# Patient Record
Sex: FEMALE | Race: WHITE | Employment: OTHER | URBAN - METROPOLITAN AREA
[De-identification: names, ages, dates, MRNs, and addresses within clinical notes are randomized per-mention and may not be internally consistent; named-entity substitution may affect disease eponyms.]

---

## 2017-04-03 ENCOUNTER — ANESTHESIA (OUTPATIENT)
Dept: SURGERY | Age: 82
DRG: 481 | End: 2017-04-03
Payer: MEDICARE

## 2017-04-03 ENCOUNTER — HOSPITAL ENCOUNTER (INPATIENT)
Age: 82
LOS: 3 days | Discharge: NURSING, REHAB OR LONG-TERM CARE | DRG: 481 | End: 2017-04-06
Attending: EMERGENCY MEDICINE | Admitting: ORTHOPAEDIC SURGERY
Payer: MEDICARE

## 2017-04-03 ENCOUNTER — APPOINTMENT (OUTPATIENT)
Dept: GENERAL RADIOLOGY | Age: 82
DRG: 481 | End: 2017-04-03
Attending: ORTHOPAEDIC SURGERY
Payer: MEDICARE

## 2017-04-03 ENCOUNTER — APPOINTMENT (OUTPATIENT)
Dept: GENERAL RADIOLOGY | Age: 82
DRG: 481 | End: 2017-04-03
Attending: EMERGENCY MEDICINE
Payer: MEDICARE

## 2017-04-03 ENCOUNTER — ANESTHESIA EVENT (OUTPATIENT)
Dept: SURGERY | Age: 82
DRG: 481 | End: 2017-04-03
Payer: MEDICARE

## 2017-04-03 DIAGNOSIS — S52.602A RADIUS AND ULNA DISTAL FRACTURE, LEFT, CLOSED, INITIAL ENCOUNTER: ICD-10-CM

## 2017-04-03 DIAGNOSIS — S52.502A RADIUS AND ULNA DISTAL FRACTURE, LEFT, CLOSED, INITIAL ENCOUNTER: ICD-10-CM

## 2017-04-03 DIAGNOSIS — S72.351A CLOSED DISPLACED COMMINUTED FRACTURE OF SHAFT OF RIGHT FEMUR, INITIAL ENCOUNTER (HCC): Primary | ICD-10-CM

## 2017-04-03 PROBLEM — S52.502B OPEN FRACTURE OF LEFT DISTAL RADIUS: Status: ACTIVE | Noted: 2017-04-03

## 2017-04-03 PROBLEM — S72.91XA FEMUR FRACTURE, RIGHT (HCC): Status: ACTIVE | Noted: 2017-04-03

## 2017-04-03 PROBLEM — S52.502B OPEN FRACTURE OF DISTAL END OF LEFT RADIUS: Status: ACTIVE | Noted: 2017-04-03

## 2017-04-03 LAB
ABO + RH BLD: NORMAL
ALBUMIN SERPL BCP-MCNC: 3.8 G/DL (ref 3.5–5)
ALBUMIN/GLOB SERPL: 0.8 {RATIO} (ref 1.1–2.2)
ALP SERPL-CCNC: 127 U/L (ref 45–117)
ALT SERPL-CCNC: 18 U/L (ref 12–78)
ANION GAP BLD CALC-SCNC: 8 MMOL/L (ref 5–15)
AST SERPL W P-5'-P-CCNC: 23 U/L (ref 15–37)
BASOPHILS # BLD AUTO: 0.1 K/UL (ref 0–0.1)
BASOPHILS # BLD: 0 % (ref 0–1)
BILIRUB SERPL-MCNC: 0.6 MG/DL (ref 0.2–1)
BLOOD GROUP ANTIBODIES SERPL: NORMAL
BUN SERPL-MCNC: 19 MG/DL (ref 6–20)
BUN/CREAT SERPL: 19 (ref 12–20)
CALCIUM SERPL-MCNC: 8.7 MG/DL (ref 8.5–10.1)
CHLORIDE SERPL-SCNC: 103 MMOL/L (ref 97–108)
CO2 SERPL-SCNC: 30 MMOL/L (ref 21–32)
CREAT SERPL-MCNC: 0.99 MG/DL (ref 0.55–1.02)
EOSINOPHIL # BLD: 0.3 K/UL (ref 0–0.4)
EOSINOPHIL NFR BLD: 2 % (ref 0–7)
ERYTHROCYTE [DISTWIDTH] IN BLOOD BY AUTOMATED COUNT: 16.4 % (ref 11.5–14.5)
EST. AVERAGE GLUCOSE BLD GHB EST-MCNC: 137 MG/DL
GLOBULIN SER CALC-MCNC: 4.6 G/DL (ref 2–4)
GLUCOSE SERPL-MCNC: 155 MG/DL (ref 65–100)
HBA1C MFR BLD: 6.4 % (ref 4.2–6.3)
HCT VFR BLD AUTO: 41.5 % (ref 35–47)
HGB BLD-MCNC: 12.9 G/DL (ref 11.5–16)
LYMPHOCYTES # BLD AUTO: 15 % (ref 12–49)
LYMPHOCYTES # BLD: 1.9 K/UL (ref 0.8–3.5)
MCH RBC QN AUTO: 24.6 PG (ref 26–34)
MCHC RBC AUTO-ENTMCNC: 31.1 G/DL (ref 30–36.5)
MCV RBC AUTO: 79.2 FL (ref 80–99)
MONOCYTES # BLD: 1.1 K/UL (ref 0–1)
MONOCYTES NFR BLD AUTO: 9 % (ref 5–13)
NEUTS SEG # BLD: 9.1 K/UL (ref 1.8–8)
NEUTS SEG NFR BLD AUTO: 74 % (ref 32–75)
PLATELET # BLD AUTO: 283 K/UL (ref 150–400)
POTASSIUM SERPL-SCNC: 3.2 MMOL/L (ref 3.5–5.1)
PROT SERPL-MCNC: 8.4 G/DL (ref 6.4–8.2)
RBC # BLD AUTO: 5.24 M/UL (ref 3.8–5.2)
SODIUM SERPL-SCNC: 141 MMOL/L (ref 136–145)
SPECIMEN EXP DATE BLD: NORMAL
WBC # BLD AUTO: 12.4 K/UL (ref 3.6–11)

## 2017-04-03 PROCEDURE — 71010 XR CHEST SNGL V: CPT

## 2017-04-03 PROCEDURE — 93005 ELECTROCARDIOGRAM TRACING: CPT

## 2017-04-03 PROCEDURE — 77030013079 HC BLNKT BAIR HGGR 3M -A: Performed by: ANESTHESIOLOGY

## 2017-04-03 PROCEDURE — 77030026438 HC STYL ET INTUB CARD -A: Performed by: NURSE ANESTHETIST, CERTIFIED REGISTERED

## 2017-04-03 PROCEDURE — 74011000250 HC RX REV CODE- 250

## 2017-04-03 PROCEDURE — 76060000038 HC ANESTHESIA 3.5 TO 4 HR: Performed by: ORTHOPAEDIC SURGERY

## 2017-04-03 PROCEDURE — C1713 ANCHOR/SCREW BN/BN,TIS/BN: HCPCS | Performed by: ORTHOPAEDIC SURGERY

## 2017-04-03 PROCEDURE — 96375 TX/PRO/DX INJ NEW DRUG ADDON: CPT

## 2017-04-03 PROCEDURE — 74011250636 HC RX REV CODE- 250/636

## 2017-04-03 PROCEDURE — 77030008467 HC STPLR SKN COVD -B: Performed by: ORTHOPAEDIC SURGERY

## 2017-04-03 PROCEDURE — 73552 X-RAY EXAM OF FEMUR 2/>: CPT

## 2017-04-03 PROCEDURE — 77030014405 HC GD ROD RMR SYNT -C: Performed by: ORTHOPAEDIC SURGERY

## 2017-04-03 PROCEDURE — 76001 XR FLUOROSCOPY OVER 60 MINUTES: CPT

## 2017-04-03 PROCEDURE — 85025 COMPLETE CBC W/AUTO DIFF WBC: CPT | Performed by: EMERGENCY MEDICINE

## 2017-04-03 PROCEDURE — 77030008684 HC TU ET CUF COVD -B: Performed by: NURSE ANESTHETIST, CERTIFIED REGISTERED

## 2017-04-03 PROCEDURE — 0QS806Z REPOSITION RIGHT FEMORAL SHAFT WITH INTRAMEDULLARY INTERNAL FIXATION DEVICE, OPEN APPROACH: ICD-10-PCS | Performed by: ORTHOPAEDIC SURGERY

## 2017-04-03 PROCEDURE — 74011250636 HC RX REV CODE- 250/636: Performed by: ANESTHESIOLOGY

## 2017-04-03 PROCEDURE — 76000 FLUOROSCOPY <1 HR PHYS/QHP: CPT

## 2017-04-03 PROCEDURE — 77030000031 HC BIT DRL QC SYNT -C: Performed by: ORTHOPAEDIC SURGERY

## 2017-04-03 PROCEDURE — 76210000006 HC OR PH I REC 0.5 TO 1 HR: Performed by: ORTHOPAEDIC SURGERY

## 2017-04-03 PROCEDURE — 76010000134 HC OR TIME 3.5 TO 4 HR: Performed by: ORTHOPAEDIC SURGERY

## 2017-04-03 PROCEDURE — 0PSJXZZ REPOSITION LEFT RADIUS, EXTERNAL APPROACH: ICD-10-PCS | Performed by: ORTHOPAEDIC SURGERY

## 2017-04-03 PROCEDURE — 77030021678 HC GLIDESCP STAT DISP VERT -B: Performed by: NURSE ANESTHETIST, CERTIFIED REGISTERED

## 2017-04-03 PROCEDURE — 0PSLXZZ REPOSITION LEFT ULNA, EXTERNAL APPROACH: ICD-10-PCS | Performed by: ORTHOPAEDIC SURGERY

## 2017-04-03 PROCEDURE — 77030002933 HC SUT MCRYL J&J -A: Performed by: ORTHOPAEDIC SURGERY

## 2017-04-03 PROCEDURE — C1769 GUIDE WIRE: HCPCS | Performed by: ORTHOPAEDIC SURGERY

## 2017-04-03 PROCEDURE — 74011250636 HC RX REV CODE- 250/636: Performed by: EMERGENCY MEDICINE

## 2017-04-03 PROCEDURE — 77030018846 HC SOL IRR STRL H20 ICUM -A: Performed by: ORTHOPAEDIC SURGERY

## 2017-04-03 PROCEDURE — 86900 BLOOD TYPING SEROLOGIC ABO: CPT | Performed by: NURSE PRACTITIONER

## 2017-04-03 PROCEDURE — 77030019908 HC STETH ESOPH SIMS -A: Performed by: NURSE ANESTHETIST, CERTIFIED REGISTERED

## 2017-04-03 PROCEDURE — 99285 EMERGENCY DEPT VISIT HI MDM: CPT

## 2017-04-03 PROCEDURE — 0HDDXZZ EXTRACTION OF RIGHT LOWER ARM SKIN, EXTERNAL APPROACH: ICD-10-PCS | Performed by: ORTHOPAEDIC SURGERY

## 2017-04-03 PROCEDURE — 74011250636 HC RX REV CODE- 250/636: Performed by: INTERNAL MEDICINE

## 2017-04-03 PROCEDURE — 77030016474 HC BIT DRL QC3 SYNT -C: Performed by: ORTHOPAEDIC SURGERY

## 2017-04-03 PROCEDURE — 83036 HEMOGLOBIN GLYCOSYLATED A1C: CPT | Performed by: INTERNAL MEDICINE

## 2017-04-03 PROCEDURE — 73070 X-RAY EXAM OF ELBOW: CPT

## 2017-04-03 PROCEDURE — 77030031139 HC SUT VCRL2 J&J -A: Performed by: ORTHOPAEDIC SURGERY

## 2017-04-03 PROCEDURE — 73110 X-RAY EXAM OF WRIST: CPT

## 2017-04-03 PROCEDURE — 36415 COLL VENOUS BLD VENIPUNCTURE: CPT | Performed by: EMERGENCY MEDICINE

## 2017-04-03 PROCEDURE — 80053 COMPREHEN METABOLIC PANEL: CPT | Performed by: EMERGENCY MEDICINE

## 2017-04-03 PROCEDURE — 77030018836 HC SOL IRR NACL ICUM -A: Performed by: ORTHOPAEDIC SURGERY

## 2017-04-03 PROCEDURE — 96374 THER/PROPH/DIAG INJ IV PUSH: CPT

## 2017-04-03 PROCEDURE — 77030011640 HC PAD GRND REM COVD -A: Performed by: ORTHOPAEDIC SURGERY

## 2017-04-03 DEVICE — SCREW BNE L80MM DIA5MM NONSTERILE TIB LT GRN TI ST CANN LOK: Type: IMPLANTABLE DEVICE | Site: LEG | Status: FUNCTIONAL

## 2017-04-03 DEVICE — SCREW BNE L36MM DIA5MM NONSTERILE TIB LT GRN TI ST CANN LOK: Type: IMPLANTABLE DEVICE | Site: LEG | Status: FUNCTIONAL

## 2017-04-03 DEVICE — SCREW BNE L40MM DIA5MM TIB LT GRN TI ST CANN LOK FULL THRD: Type: IMPLANTABLE DEVICE | Site: LEG | Status: FUNCTIONAL

## 2017-04-03 RX ORDER — MORPHINE SULFATE 10 MG/ML
2 INJECTION, SOLUTION INTRAMUSCULAR; INTRAVENOUS
Status: DISCONTINUED | OUTPATIENT
Start: 2017-04-03 | End: 2017-04-04 | Stop reason: HOSPADM

## 2017-04-03 RX ORDER — SODIUM CHLORIDE 0.9 % (FLUSH) 0.9 %
5-10 SYRINGE (ML) INJECTION AS NEEDED
Status: DISCONTINUED | OUTPATIENT
Start: 2017-04-03 | End: 2017-04-04 | Stop reason: HOSPADM

## 2017-04-03 RX ORDER — FENTANYL CITRATE 50 UG/ML
INJECTION, SOLUTION INTRAMUSCULAR; INTRAVENOUS AS NEEDED
Status: DISCONTINUED | OUTPATIENT
Start: 2017-04-03 | End: 2017-04-04 | Stop reason: HOSPADM

## 2017-04-03 RX ORDER — PROPOFOL 10 MG/ML
1 INJECTION, EMULSION INTRAVENOUS
Status: DISCONTINUED | OUTPATIENT
Start: 2017-04-03 | End: 2017-04-03

## 2017-04-03 RX ORDER — METOPROLOL SUCCINATE 25 MG/1
25 TABLET, EXTENDED RELEASE ORAL
COMMUNITY

## 2017-04-03 RX ORDER — PROMETHAZINE HYDROCHLORIDE 12.5 MG/1
12.5 TABLET ORAL
COMMUNITY

## 2017-04-03 RX ORDER — MORPHINE SULFATE 4 MG/ML
4 INJECTION, SOLUTION INTRAMUSCULAR; INTRAVENOUS ONCE
Status: COMPLETED | OUTPATIENT
Start: 2017-04-03 | End: 2017-04-03

## 2017-04-03 RX ORDER — MAGNESIUM SULFATE 100 %
4 CRYSTALS MISCELLANEOUS AS NEEDED
Status: DISCONTINUED | OUTPATIENT
Start: 2017-04-03 | End: 2017-04-04

## 2017-04-03 RX ORDER — ONDANSETRON 2 MG/ML
4 INJECTION INTRAMUSCULAR; INTRAVENOUS
Status: COMPLETED | OUTPATIENT
Start: 2017-04-03 | End: 2017-04-03

## 2017-04-03 RX ORDER — FACIAL-BODY WIPES
10 EACH TOPICAL
COMMUNITY

## 2017-04-03 RX ORDER — SODIUM CHLORIDE 9 MG/ML
75 INJECTION, SOLUTION INTRAVENOUS CONTINUOUS
Status: DISCONTINUED | OUTPATIENT
Start: 2017-04-03 | End: 2017-04-05

## 2017-04-03 RX ORDER — SODIUM CHLORIDE 0.9 % (FLUSH) 0.9 %
5-10 SYRINGE (ML) INJECTION AS NEEDED
Status: DISCONTINUED | OUTPATIENT
Start: 2017-04-03 | End: 2017-04-04

## 2017-04-03 RX ORDER — ONDANSETRON 2 MG/ML
INJECTION INTRAMUSCULAR; INTRAVENOUS AS NEEDED
Status: DISCONTINUED | OUTPATIENT
Start: 2017-04-03 | End: 2017-04-04 | Stop reason: HOSPADM

## 2017-04-03 RX ORDER — ACETAMINOPHEN 500 MG/1
1-2 CAPSULE, LIQUID FILLED ORAL
COMMUNITY
End: 2017-04-06

## 2017-04-03 RX ORDER — FENTANYL CITRATE 50 UG/ML
25 INJECTION, SOLUTION INTRAMUSCULAR; INTRAVENOUS
Status: DISCONTINUED | OUTPATIENT
Start: 2017-04-03 | End: 2017-04-04 | Stop reason: HOSPADM

## 2017-04-03 RX ORDER — LIDOCAINE HYDROCHLORIDE 20 MG/ML
INJECTION, SOLUTION EPIDURAL; INFILTRATION; INTRACAUDAL; PERINEURAL AS NEEDED
Status: DISCONTINUED | OUTPATIENT
Start: 2017-04-03 | End: 2017-04-04 | Stop reason: HOSPADM

## 2017-04-03 RX ORDER — BRIMONIDINE TARTRATE, TIMOLOL MALEATE 2; 5 MG/ML; MG/ML
1 SOLUTION/ DROPS OPHTHALMIC EVERY 12 HOURS
COMMUNITY

## 2017-04-03 RX ORDER — ACETAMINOPHEN 325 MG/1
650 TABLET ORAL EVERY 6 HOURS
Status: DISCONTINUED | OUTPATIENT
Start: 2017-04-03 | End: 2017-04-04

## 2017-04-03 RX ORDER — DEXTROSE 50 % IN WATER (D50W) INTRAVENOUS SYRINGE
12.5-25 AS NEEDED
Status: DISCONTINUED | OUTPATIENT
Start: 2017-04-03 | End: 2017-04-04

## 2017-04-03 RX ORDER — HYDRALAZINE HYDROCHLORIDE 20 MG/ML
10 INJECTION INTRAMUSCULAR; INTRAVENOUS
Status: DISCONTINUED | OUTPATIENT
Start: 2017-04-03 | End: 2017-04-06 | Stop reason: HOSPADM

## 2017-04-03 RX ORDER — PROPOFOL 10 MG/ML
INJECTION, EMULSION INTRAVENOUS AS NEEDED
Status: DISCONTINUED | OUTPATIENT
Start: 2017-04-03 | End: 2017-04-04 | Stop reason: HOSPADM

## 2017-04-03 RX ORDER — CEFAZOLIN SODIUM IN 0.9 % NACL 2 G/100 ML
2 PLASTIC BAG, INJECTION (ML) INTRAVENOUS ONCE
Status: DISCONTINUED | OUTPATIENT
Start: 2017-04-03 | End: 2017-04-04 | Stop reason: HOSPADM

## 2017-04-03 RX ORDER — ADHESIVE BANDAGE
30 BANDAGE TOPICAL DAILY PRN
COMMUNITY

## 2017-04-03 RX ORDER — SODIUM CHLORIDE 0.9 % (FLUSH) 0.9 %
5-10 SYRINGE (ML) INJECTION EVERY 8 HOURS
Status: DISCONTINUED | OUTPATIENT
Start: 2017-04-03 | End: 2017-04-04

## 2017-04-03 RX ORDER — SUCCINYLCHOLINE CHLORIDE 20 MG/ML
INJECTION INTRAMUSCULAR; INTRAVENOUS AS NEEDED
Status: DISCONTINUED | OUTPATIENT
Start: 2017-04-03 | End: 2017-04-04 | Stop reason: HOSPADM

## 2017-04-03 RX ORDER — HYDRALAZINE HYDROCHLORIDE 20 MG/ML
10 INJECTION INTRAMUSCULAR; INTRAVENOUS ONCE
Status: COMPLETED | OUTPATIENT
Start: 2017-04-03 | End: 2017-04-03

## 2017-04-03 RX ORDER — AMOXICILLIN 250 MG
1 CAPSULE ORAL DAILY
Status: ON HOLD | COMMUNITY
End: 2017-04-06

## 2017-04-03 RX ORDER — SODIUM CHLORIDE, SODIUM LACTATE, POTASSIUM CHLORIDE, CALCIUM CHLORIDE 600; 310; 30; 20 MG/100ML; MG/100ML; MG/100ML; MG/100ML
25 INJECTION, SOLUTION INTRAVENOUS CONTINUOUS
Status: DISCONTINUED | OUTPATIENT
Start: 2017-04-03 | End: 2017-04-04 | Stop reason: HOSPADM

## 2017-04-03 RX ORDER — GLYCOPYRROLATE 0.2 MG/ML
INJECTION INTRAMUSCULAR; INTRAVENOUS AS NEEDED
Status: DISCONTINUED | OUTPATIENT
Start: 2017-04-03 | End: 2017-04-04 | Stop reason: HOSPADM

## 2017-04-03 RX ORDER — OXYCODONE HYDROCHLORIDE 5 MG/1
10 TABLET ORAL
Status: DISCONTINUED | OUTPATIENT
Start: 2017-04-03 | End: 2017-04-04

## 2017-04-03 RX ORDER — ONDANSETRON 2 MG/ML
4 INJECTION INTRAMUSCULAR; INTRAVENOUS
Status: DISCONTINUED | OUTPATIENT
Start: 2017-04-03 | End: 2017-04-05

## 2017-04-03 RX ORDER — LEVOTHYROXINE SODIUM 100 UG/1
100 TABLET ORAL
Status: DISCONTINUED | OUTPATIENT
Start: 2017-04-04 | End: 2017-04-06

## 2017-04-03 RX ORDER — HYDROMORPHONE HYDROCHLORIDE 1 MG/ML
.2-.5 INJECTION, SOLUTION INTRAMUSCULAR; INTRAVENOUS; SUBCUTANEOUS
Status: DISCONTINUED | OUTPATIENT
Start: 2017-04-03 | End: 2017-04-04 | Stop reason: HOSPADM

## 2017-04-03 RX ORDER — DIPHENHYDRAMINE HYDROCHLORIDE 50 MG/ML
12.5 INJECTION, SOLUTION INTRAMUSCULAR; INTRAVENOUS
Status: DISCONTINUED | OUTPATIENT
Start: 2017-04-03 | End: 2017-04-04 | Stop reason: HOSPADM

## 2017-04-03 RX ORDER — LIDOCAINE HYDROCHLORIDE 10 MG/ML
0.1 INJECTION, SOLUTION EPIDURAL; INFILTRATION; INTRACAUDAL; PERINEURAL AS NEEDED
Status: DISCONTINUED | OUTPATIENT
Start: 2017-04-03 | End: 2017-04-04 | Stop reason: HOSPADM

## 2017-04-03 RX ORDER — SODIUM CHLORIDE 0.9 % (FLUSH) 0.9 %
5-10 SYRINGE (ML) INJECTION EVERY 8 HOURS
Status: DISCONTINUED | OUTPATIENT
Start: 2017-04-03 | End: 2017-04-04 | Stop reason: HOSPADM

## 2017-04-03 RX ORDER — TIMOLOL MALEATE 5 MG/ML
1 SOLUTION/ DROPS OPHTHALMIC 2 TIMES DAILY
Status: DISCONTINUED | OUTPATIENT
Start: 2017-04-04 | End: 2017-04-04

## 2017-04-03 RX ORDER — PHENYLEPHRINE HCL IN 0.9% NACL 0.4MG/10ML
SYRINGE (ML) INTRAVENOUS AS NEEDED
Status: DISCONTINUED | OUTPATIENT
Start: 2017-04-03 | End: 2017-04-04 | Stop reason: HOSPADM

## 2017-04-03 RX ORDER — METOPROLOL SUCCINATE 25 MG/1
25 TABLET, EXTENDED RELEASE ORAL DAILY
Status: DISCONTINUED | OUTPATIENT
Start: 2017-04-04 | End: 2017-04-06 | Stop reason: HOSPADM

## 2017-04-03 RX ORDER — MORPHINE SULFATE 2 MG/ML
2 INJECTION, SOLUTION INTRAMUSCULAR; INTRAVENOUS
Status: DISCONTINUED | OUTPATIENT
Start: 2017-04-03 | End: 2017-04-04

## 2017-04-03 RX ORDER — OXYCODONE HYDROCHLORIDE 5 MG/1
5 TABLET ORAL
Status: DISCONTINUED | OUTPATIENT
Start: 2017-04-03 | End: 2017-04-04

## 2017-04-03 RX ORDER — AMOXICILLIN 250 MG
2 CAPSULE ORAL 2 TIMES DAILY
Status: DISCONTINUED | OUTPATIENT
Start: 2017-04-03 | End: 2017-04-06 | Stop reason: HOSPADM

## 2017-04-03 RX ORDER — HYDROMORPHONE HYDROCHLORIDE 2 MG/ML
INJECTION, SOLUTION INTRAMUSCULAR; INTRAVENOUS; SUBCUTANEOUS AS NEEDED
Status: DISCONTINUED | OUTPATIENT
Start: 2017-04-03 | End: 2017-04-04 | Stop reason: HOSPADM

## 2017-04-03 RX ORDER — NALOXONE HYDROCHLORIDE 0.4 MG/ML
0.4 INJECTION, SOLUTION INTRAMUSCULAR; INTRAVENOUS; SUBCUTANEOUS AS NEEDED
Status: DISCONTINUED | OUTPATIENT
Start: 2017-04-03 | End: 2017-04-05

## 2017-04-03 RX ORDER — INSULIN LISPRO 100 [IU]/ML
INJECTION, SOLUTION INTRAVENOUS; SUBCUTANEOUS
Status: DISCONTINUED | OUTPATIENT
Start: 2017-04-03 | End: 2017-04-04

## 2017-04-03 RX ORDER — ERGOCALCIFEROL 1.25 MG/1
50000 CAPSULE ORAL
COMMUNITY

## 2017-04-03 RX ORDER — LEVOTHYROXINE SODIUM 100 UG/1
100 TABLET ORAL
COMMUNITY
End: 2018-01-01 | Stop reason: DRUGHIGH

## 2017-04-03 RX ADMIN — HYDRALAZINE HYDROCHLORIDE 10 MG: 20 INJECTION INTRAMUSCULAR; INTRAVENOUS at 20:09

## 2017-04-03 RX ADMIN — LIDOCAINE HYDROCHLORIDE 80 MG: 20 INJECTION, SOLUTION EPIDURAL; INFILTRATION; INTRACAUDAL; PERINEURAL at 21:30

## 2017-04-03 RX ADMIN — ONDANSETRON HYDROCHLORIDE 4 MG: 2 INJECTION, SOLUTION INTRAMUSCULAR; INTRAVENOUS at 15:54

## 2017-04-03 RX ADMIN — GLYCOPYRROLATE 0.4 MG: 0.2 INJECTION INTRAMUSCULAR; INTRAVENOUS at 22:01

## 2017-04-03 RX ADMIN — HYDROMORPHONE HYDROCHLORIDE 0.5 MG: 2 INJECTION, SOLUTION INTRAMUSCULAR; INTRAVENOUS; SUBCUTANEOUS at 22:25

## 2017-04-03 RX ADMIN — ONDANSETRON 4 MG: 2 INJECTION INTRAMUSCULAR; INTRAVENOUS at 23:19

## 2017-04-03 RX ADMIN — Medication 120 MCG: at 22:01

## 2017-04-03 RX ADMIN — PROPOFOL 70.8 MG: 10 INJECTION, EMULSION INTRAVENOUS at 18:53

## 2017-04-03 RX ADMIN — SUCCINYLCHOLINE CHLORIDE 80 MG: 20 INJECTION INTRAMUSCULAR; INTRAVENOUS at 21:30

## 2017-04-03 RX ADMIN — GLYCOPYRROLATE 0.2 MG: 0.2 INJECTION INTRAMUSCULAR; INTRAVENOUS at 21:48

## 2017-04-03 RX ADMIN — Medication 120 MCG: at 21:38

## 2017-04-03 RX ADMIN — PROPOFOL 100 MG: 10 INJECTION, EMULSION INTRAVENOUS at 21:30

## 2017-04-03 RX ADMIN — PROPOFOL 30 MG: 10 INJECTION, EMULSION INTRAVENOUS at 19:08

## 2017-04-03 RX ADMIN — FENTANYL CITRATE 100 MCG: 50 INJECTION, SOLUTION INTRAMUSCULAR; INTRAVENOUS at 21:30

## 2017-04-03 RX ADMIN — SODIUM CHLORIDE, POTASSIUM CHLORIDE, SODIUM LACTATE AND CALCIUM CHLORIDE: 600; 310; 30; 20 INJECTION, SOLUTION INTRAVENOUS at 20:56

## 2017-04-03 RX ADMIN — MORPHINE SULFATE 4 MG: 4 INJECTION, SOLUTION INTRAMUSCULAR; INTRAVENOUS at 15:55

## 2017-04-03 RX ADMIN — PROPOFOL 30 MG: 10 INJECTION, EMULSION INTRAVENOUS at 19:02

## 2017-04-03 NOTE — IP AVS SNAPSHOT
Current Discharge Medication List  
  
START taking these medications Dose & Instructions Dispensing Information Comments Morning Noon Evening Bedtime  
 acetaminophen 325 mg tablet Commonly known as:  TYLENOL Replaces:  Mapap 500 mg Cap Your last dose was: Your next dose is:    
   
   
 Dose:  650 mg Take 2 Tabs by mouth every six (6) hours for 14 days. Quantity:  112 Tab Refills:  0  
     
   
   
   
  
 amLODIPine 5 mg tablet Commonly known as:  Corinne Lees Your last dose was: Your next dose is:    
   
   
 Dose:  5 mg Take 1 Tab by mouth daily. Quantity:  30 Tab Refills:  0  
     
   
   
   
  
 ascorbic acid (vitamin C) 500 mg tablet Commonly known as:  VITAMIN C Your last dose was: Your next dose is:    
   
   
 Dose:  500 mg Take 1 Tab by mouth two (2) times a day for 30 days. Quantity:  60 Tab Refills:  0  
     
   
   
   
  
 aspirin 325 mg tablet Commonly known as:  ASPIRIN Start taking on:  4/17/2017 Your last dose was: Your next dose is:    
   
   
 Dose:  325 mg Take 1 Tab by mouth daily for 14 days. Starting on 4/17/17 after completing two weeks of Lovenox Quantity:  28 Tab Refills:  0 Calcium Citrate-Vitamin D3 tablet Commonly known as:  Shala Neil Your last dose was: Your next dose is:    
   
   
 Dose:  1 Tab Take 1 Tab by mouth two (2) times a day for 30 days. Quantity:  60 Tab Refills:  0  
     
   
   
   
  
 cholecalciferol 1,000 unit tablet Commonly known as:  VITAMIN D3 Your last dose was: Your next dose is:    
   
   
 Dose:  4000 Units Take 4 Tabs by mouth daily for 30 days. Quantity:  120 Tab Refills:  0  
     
   
   
   
  
 enoxaparin 40 mg/0.4 mL Commonly known as:  LOVENOX Your last dose was:     
   
Your next dose is:    
   
   
 Dose:  40 mg  
 0.4 mL by SubCUTAneous route every twenty-four (24) hours for 11 days. Last dose 4/17/17  Indications: DEEP VEIN THROMBOSIS PREVENTION Quantity:  4.4 mL Refills:  0  
     
   
   
   
  
 oxyCODONE IR 5 mg immediate release tablet Commonly known as:  Dayton Jean Your last dose was: Your next dose is:    
   
   
 Dose:  5-10 mg Take 1-2 Tabs by mouth every four (4) hours as needed. Max Daily Amount: 60 mg.  
 Quantity:  30 Tab Refills:  0  
     
   
   
   
  
 polyethylene glycol 17 gram/dose powder Commonly known as:  [place]eta [place] Your last dose was: Your next dose is:    
   
   
 Dose:  17 g Take 17 g by mouth daily for 15 days. Quantity:  255 g Refills:  0  
     
   
   
   
  
 traMADol 50 mg tablet Commonly known as:  ULTRAM  
   
Your last dose was: Your next dose is:    
   
   
 Dose:  50 mg Take 1 Tab by mouth every six (6) hours as needed. Max Daily Amount: 200 mg. Quantity:  30 Tab Refills:  0 CONTINUE these medications which have CHANGED Dose & Instructions Dispensing Information Comments Morning Noon Evening Bedtime SENEXON-S 8.6-50 mg per tablet Generic drug:  senna-docusate What changed:  when to take this Your last dose was: Your next dose is:    
   
   
 Dose:  1 Tab Take 1 Tab by mouth two (2) times a day. Refills:  0 CONTINUE these medications which have NOT CHANGED Dose & Instructions Dispensing Information Comments Morning Noon Evening Bedtime BISAC-EVAC 10 mg suppository Generic drug:  bisacodyl Your last dose was: Your next dose is:    
   
   
 Dose:  10 mg Insert 10 mg into rectum daily. Refills:  0  
     
   
   
   
  
 COMBIGAN 0.2-0.5 % Drop ophthalmic solution Generic drug:  brimonidine-timolol Your last dose was: Your next dose is:    
   
   
 Dose:  1 Drop Administer 1 Drop to right eye every twelve (12) hours. Refills:  0  
     
   
   
   
  
 DISPOSABLE ENEMA 19-7 gram/118 mL enema Generic drug:  sodium phosphate Your last dose was: Your next dose is:    
   
   
 Dose:  1 Enema Insert 1 Enema into rectum now. Refills:  0  
     
   
   
   
  
 levothyroxine 100 mcg tablet Commonly known as:  SYNTHROID Your last dose was: Your next dose is:    
   
   
 Dose:  100 mcg Take 100 mcg by mouth Daily (before breakfast). Refills:  0  
     
   
   
   
  
 metoprolol succinate 25 mg XL tablet Commonly known as:  TOPROL-XL Your last dose was: Your next dose is:    
   
   
 Dose:  25 mg Take 25 mg by mouth daily. Refills:  0 MILK OF MAGNESIA 400 mg/5 mL suspension Generic drug:  magnesium hydroxide Your last dose was: Your next dose is:    
   
   
 Dose:  30 mL Take 30 mL by mouth daily as needed for Constipation. Refills:  0  
     
   
   
   
  
 promethazine 12.5 mg tablet Commonly known as:  PHENERGAN Your last dose was: Your next dose is: Take  by mouth every six (6) hours as needed for Nausea. Refills:  0  
     
   
   
   
  
 VITAMIN D2 50,000 unit capsule Generic drug:  ergocalciferol Your last dose was: Your next dose is:    
   
   
 Dose:  48052 Units Take 50,000 Units by mouth every Sunday. Refills:  0 STOP taking these medications Mapap 500 mg Cap Generic drug:  acetaminophen Replaced by:  acetaminophen 325 mg tablet Where to Get Your Medications Information on where to get these meds will be given to you by the nurse or doctor. ! Ask your nurse or doctor about these medications  
  acetaminophen 325 mg tablet  
 amLODIPine 5 mg tablet  
 ascorbic acid (vitamin C) 500 mg tablet  
 aspirin 325 mg tablet Calcium Citrate-Vitamin D3 tablet  
 cholecalciferol 1,000 unit tablet  
 enoxaparin 40 mg/0.4 mL  
 oxyCODONE IR 5 mg immediate release tablet  
 polyethylene glycol 17 gram/dose powder  
 traMADol 50 mg tablet

## 2017-04-03 NOTE — H&P
ORTHOPAEDIC CONSULT NOTE    Subjective:     Date of Consultation:  April 3, 2017      Ashvin Argueta is a 80 y.o. female who is being seen for R midshaft femur fracture and L distal radius fracture. Pt states she tripped on the sidewalk while helping her sister get out of the car this afternoon. Pt lives at an assisted living facility. Pt's family at bedside during exam. Plan of care discussed with pt and family, all questions/concerns addressed and pt and family verbalized understanding of plan of care.      Patient Active Problem List    Diagnosis Date Noted    Femur fracture, right ([address] 2017    Open fracture of distal end of left radius 2017    Femoral fracture ([address] 2016    After-cataract, obscuring vision 2014    Primary open-angle glaucoma 2012    Acute renal failure ([address] 10/14/2011    Diarrhea 10/14/2011    Vitamin D deficiency 10/06/2010    Macular puckering of retina, right eye     Unspecified hypothyroidism     Diffuse cystic mastopathy     HTN (hypertension) 2010    Glaucoma 2010    Cholelithiases 2010    Stress incontinence 2010    Kidney stone 2010    Decreased hearing 2010    DM type 2 (diabetes mellitus, type 2) ([address] 2010    Actinic keratoses 2010     Family History   Problem Relation Age of Onset    Heart Disease Mother     Cancer Brother      lung    Cancer Sister      gallbladder  12    Diabetes Sister     Cancer Sister      colon at 48    Cancer Brother      leukemia      Social History   Substance Use Topics    Smoking status: Former Smoker     Packs/day: 1.50     Years: 20.00     Quit date: 1982    Smokeless tobacco: Never Used    Alcohol use No     Past Medical History:   Diagnosis Date    Actinic keratoses 3/19/2010    Cholelithiases 3/19/2010    Decreased hearing 3/19/2010    Diffuse cystic mastopathy     Elevated C-reactive protein (CRP)     Glaucoma 3/19/2010    History of kidney stones     HTN (hypertension) 3/19/2010    Macular puckering of retina, right eye     Stress incontinence 3/19/2010    Unspecified hypothyroidism     Vitamin D deficiency 10/6/2010      Past Surgical History:   Procedure Laterality Date    ENDOSCOPY, COLON, DIAGNOSTIC  5/1/06    HX APPENDECTOMY      HX CATARACT REMOVAL      HX CYST INCISION AND DRAINAGE      left breast x 2    HX DINESH AND BSO      over several procedures    HX TONSILLECTOMY      HX TRABECULECTOMY  1990    laser OD      Prior to Admission medications    Medication Sig Start Date End Date Taking? Authorizing Provider   brimonidine-timolol (COMBIGAN) 0.2-0.5 % drop ophthalmic solution Administer 1 Drop to right eye every twelve (12) hours. Yes Tyler Muller MD   bisacodyl (BISAC-EVAC) 10 mg suppository Insert 10 mg into rectum daily. Yes yTler Muller MD   sodium phosphate (DISPOSABLE ENEMA) 19-7 gram/118 mL enema Insert 1 Enema into rectum now. Yes Tyler Muller MD   acetaminophen (MAPAP) 500 mg cap Take 1-2 Tabs by mouth every six (6) hours as needed. Yes Tyler Muller MD   magnesium hydroxide (MILK OF MAGNESIA) 400 mg/5 mL suspension Take 30 mL by mouth daily as needed for Constipation. Yes Tyler Muller MD   promethazine (PHENERGAN) 12.5 mg tablet Take  by mouth every six (6) hours as needed for Nausea. Yes Tyler Muller MD   senna-docusate (SENEXON-S) 8.6-50 mg per tablet Take 1 Tab by mouth daily. Yes Tyler Muller MD   metoprolol succinate (TOPROL-XL) 25 mg XL tablet Take 25 mg by mouth daily. Yes Tyler Muller MD   ergocalciferol (VITAMIN D2) 50,000 unit capsule Take 50,000 Units by mouth every Sunday. Wilmer Muller MD   levothyroxine (SYNTHROID) 100 mcg tablet Take 100 mcg by mouth Daily (before breakfast).    Yes Tyler Muller MD     Current Facility-Administered Medications   Medication Dose Route Frequency    propofol (DIPRIVAN) 10 mg/mL injection 70.8 mg  1 mg/kg IntraVENous TITRATE     Current Outpatient Prescriptions   Medication Sig    brimonidine-timolol (COMBIGAN) 0.2-0.5 % drop ophthalmic solution Administer 1 Drop to right eye every twelve (12) hours.  bisacodyl (BISAC-EVAC) 10 mg suppository Insert 10 mg into rectum daily.  sodium phosphate (DISPOSABLE ENEMA) 19-7 gram/118 mL enema Insert 1 Enema into rectum now.  acetaminophen (MAPAP) 500 mg cap Take 1-2 Tabs by mouth every six (6) hours as needed.  magnesium hydroxide (MILK OF MAGNESIA) 400 mg/5 mL suspension Take 30 mL by mouth daily as needed for Constipation.  promethazine (PHENERGAN) 12.5 mg tablet Take  by mouth every six (6) hours as needed for Nausea.  senna-docusate (SENEXON-S) 8.6-50 mg per tablet Take 1 Tab by mouth daily.  metoprolol succinate (TOPROL-XL) 25 mg XL tablet Take 25 mg by mouth daily.  ergocalciferol (VITAMIN D2) 50,000 unit capsule Take 50,000 Units by mouth every .  levothyroxine (SYNTHROID) 100 mcg tablet Take 100 mcg by mouth Daily (before breakfast). Allergies   Allergen Reactions    Codeine Itching    Ibuprofen Other (comments)     kidney failure    Pcn [Penicillins] Hives    Sulfur Not Reported This Time        Review of Systems:  A comprehensive review of systems was negative except for that written in the HPI.     Mental Status: no dementia    Objective:     Patient Vitals for the past 8 hrs:   BP Temp Pulse Resp SpO2 Height Weight   17 1914 (!) 219/68 - 60 18 100 % - -   17 1909 (!) 219/84 - (!) 59 22 98 % - -   17 1901 - - 61 18 99 % - -   17 1900 190/62 - 62 16 100 % - -   17 1845 196/60 - 60 14 95 % - -   17 1522 (!) 252/80 98.1 °F (36.7 °C) 60 13 97 % 5' 3\" (1.6 m) 70.8 kg (156 lb)     Temp (24hrs), Av.1 °F (36.7 °C), Min:98.1 °F (36.7 °C), Max:98.1 °F (36.7 °C)      Gen: Well-developed,  in no acute distress   Musc: RLE - + deformity of the midshaft femur, NVI  LUE - + puncture wound to the ventral side of the forearm with ecchymosis with deformity, NVI    Skin: No skin breakdown noted. Skin warm, pink, dry  Neuro: Cranial nerves are grossly intact, no focal motor weakness, follows commands appropriately   Psych: Good insight, oriented to person, place and time, alert    Imaging Review: INDICATION: mid shaft femur fx.     COMPARISON: None.     FINDINGS: Two views of the right femur on 5 images demonstrate a comminuted  fracture of the right femur centered at the junction of the middle and distal  thirds demonstrating 100% medial displacement associated with up to 5 cm  telescoping. There is moderate medial angular deformity. No dislocation is  shown. The bones are severely osteopenic. Mild osteoarthrosis of the right knee  and moderate osteoarthrosis of the right hip is demonstrated.      IMPRESSION  IMPRESSION: Comminuted fracture of right femur at junction of middle and distal  thirds with 100% displacement and mild shortening.     INDICATION: Trauma. Left wrist pain     COMPARISON: [Radiographs 2/23/2015.     FINDINGS: Three views of the left wrist demonstrate severe diffuse osteopenia  with an interval comminuted articular fracture of the distal radius  demonstrating 100% dorsal displacement with up to 5-6 mm overriding. The distal  articular margin shows moderate dorsal annular deformity. Note dislocation is  shown. Fracture at the base of the ulnar styloid process is also demonstrated. Moderate to severe first carpal metacarpal osteoarthrosis and polyarticular  digital osteoarthrosis is again shown.  Chondrocalcinosis at the medial  radiocarpal joint is again demonstrated.     IMPRESSION  IMPRESSION: Intra-articular fracture distal radius with 100% dorsal  displacement and shortening demonstrated.       Labs:   Recent Results (from the past 24 hour(s))   EKG, 12 LEAD, INITIAL    Collection Time: 04/03/17  3:40 PM   Result Value Ref Range    Ventricular Rate 63 BPM    Atrial Rate 63 BPM    P-R Interval 184 ms    QRS Duration 88 ms    Q-T Interval 434 ms    QTC Calculation (Bezet) 444 ms    Calculated P Axis 12 degrees    Calculated R Axis -15 degrees    Calculated T Axis 44 degrees    Diagnosis       Sinus rhythm with marked sinus arrhythmia with occasional premature   ventricular complexes  Left ventricular hypertrophy with repolarization abnormality  When compared with ECG of 28-MAY-2016 15:36,  premature ventricular complexes are now present  premature atrial complexes are no longer present     CBC WITH AUTOMATED DIFF    Collection Time: 04/03/17  4:02 PM   Result Value Ref Range    WBC 12.4 (H) 3.6 - 11.0 K/uL    RBC 5.24 (H) 3.80 - 5.20 M/uL    HGB 12.9 11.5 - 16.0 g/dL    HCT 41.5 35.0 - 47.0 %    MCV 79.2 (L) 80.0 - 99.0 FL    MCH 24.6 (L) 26.0 - 34.0 PG    MCHC 31.1 30.0 - 36.5 g/dL    RDW 16.4 (H) 11.5 - 14.5 %    PLATELET 941 585 - 229 K/uL    NEUTROPHILS 74 32 - 75 %    LYMPHOCYTES 15 12 - 49 %    MONOCYTES 9 5 - 13 %    EOSINOPHILS 2 0 - 7 %    BASOPHILS 0 0 - 1 %    ABS. NEUTROPHILS 9.1 (H) 1.8 - 8.0 K/UL    ABS. LYMPHOCYTES 1.9 0.8 - 3.5 K/UL    ABS. MONOCYTES 1.1 (H) 0.0 - 1.0 K/UL    ABS. EOSINOPHILS 0.3 0.0 - 0.4 K/UL    ABS. BASOPHILS 0.1 0.0 - 0.1 K/UL   METABOLIC PANEL, COMPREHENSIVE    Collection Time: 04/03/17  4:02 PM   Result Value Ref Range    Sodium 141 136 - 145 mmol/L    Potassium 3.2 (L) 3.5 - 5.1 mmol/L    Chloride 103 97 - 108 mmol/L    CO2 30 21 - 32 mmol/L    Anion gap 8 5 - 15 mmol/L    Glucose 155 (H) 65 - 100 mg/dL    BUN 19 6 - 20 MG/DL    Creatinine 0.99 0.55 - 1.02 MG/DL    BUN/Creatinine ratio 19 12 - 20      GFR est AA >60 >60 ml/min/1.73m2    GFR est non-AA 53 (L) >60 ml/min/1.73m2    Calcium 8.7 8.5 - 10.1 MG/DL    Bilirubin, total 0.6 0.2 - 1.0 MG/DL    ALT (SGPT) 18 12 - 78 U/L    AST (SGOT) 23 15 - 37 U/L    Alk.  phosphatase 127 (H) 45 - 117 U/L    Protein, total 8.4 (H) 6.4 - 8.2 g/dL    Albumin 3.8 3.5 - 5.0 g/dL    Globulin 4.6 (H) 2.0 - 4.0 g/dL    A-G Ratio 0.8 (L) 1.1 - 2.2           Impression: Patient Active Problem List    Diagnosis Date Noted    [address]ur fracture, right [address] 04/03/2017    Open fracture of distal end of left radius 04/03/2017    Femoral fracture ([facility] 05/28/2016    After-cataract, obscuring vision 08/07/2014    Primary open-angle glaucoma 08/29/2012    Acute renal failure ([address] 10/14/2011    Diarrhea 10/14/2011    Vitamin D deficiency 10/06/2010    Macular puckering of retina, right eye     Unspecified hypothyroidism     Diffuse cystic mastopathy     HTN (hypertension) 03/19/2010    Glaucoma 03/19/2010    Cholelithiases 03/19/2010    Stress incontinence 03/19/2010    Kidney stone 03/19/2010    Decreased hearing 03/19/2010    DM type 2 (diabetes mellitus, type 2) ([facility] 03/19/2010    Actinic keratoses 03/19/2010     Principal Problem:    Open fracture of distal end of left radius (4/3/2017)    Active Problems:    Femur fracture, right ([address] (4/3/2017)        Plan:   - ORIF of R femur tonight with I&D of open L distal radius fracture with Dr. Antoinette Butlre   - Medicine consulted for clearance and management of DM.   - DM (POA) - pt states she stopped her Actos a year ago and per review of the chart her PCP has been monitoring her BG   - Posterior Long Leg Splint placed to RLE with Closed Reduction with Sugar Tong Splint of Open L distal radius, see procedure note     Dr. Antoinette Butler aware and agrees with plan as above.         Sissy Hillman NP  Orthopedic Nurse Practitioner   [place]

## 2017-04-03 NOTE — ANESTHESIA PREPROCEDURE EVALUATION
Anesthetic History   No history of anesthetic complications            Review of Systems / Medical History  Patient summary reviewed, nursing notes reviewed and pertinent labs reviewed    Pulmonary          Smoker      Comments: Former smoker   Neuro/Psych         Dementia    Comments: Very hard of hearing. Mild dementia.  Cardiovascular    Hypertension: poorly controlled              Exercise tolerance: <4 METS     GI/Hepatic/Renal         Renal disease: stones and ARF       Endo/Other    Diabetes: well controlled, type 2  Hypothyroidism: well controlled       Other Findings   Comments:   Glaucoma            Physical Exam    Airway  Mallampati: III  TM Distance: 4 - 6 cm  Neck ROM: normal range of motion   Mouth opening: Normal     Cardiovascular    Rhythm: irregular  Rate: normal         Dental    Dentition: Implants     Pulmonary  Breath sounds clear to auscultation               Abdominal  GI exam deferred       Other Findings            Anesthetic Plan    ASA: 3, emergent  Anesthesia type: general    Monitoring Plan: BIS      Induction: Intravenous  Anesthetic plan and risks discussed with: Patient and Son / Daughter

## 2017-04-03 NOTE — PROGRESS NOTES
Pharmacy Medication Reconciliation     Recommendations/Findings: The following amendments were made to the patient's active medication list on file at [facility]:   1) Additions: toprol    2) Deletions: lopressor    3) Changes: none    -Clarified PTA med list with paperwork provided by nursing home. PTA medication list was corrected to the following:     Prior to Admission Medications   Prescriptions Last Dose Informant Patient Reported? Taking?   acetaminophen (MAPAP) 500 mg cap   Yes Yes   Sig: Take 1-2 Tabs by mouth every six (6) hours as needed. bisacodyl (BISAC-EVAC) 10 mg suppository   Yes Yes   Sig: Insert 10 mg into rectum daily. brimonidine-timolol (COMBIGAN) 0.2-0.5 % drop ophthalmic solution   Yes Yes   Sig: Administer 1 Drop to right eye every twelve (12) hours. ergocalciferol (VITAMIN D2) 50,000 unit capsule   Yes Yes   Sig: Take 50,000 Units by mouth every Sunday. levothyroxine (SYNTHROID) 100 mcg tablet   Yes Yes   Sig: Take 100 mcg by mouth Daily (before breakfast). magnesium hydroxide (MILK OF MAGNESIA) 400 mg/5 mL suspension   Yes Yes   Sig: Take 30 mL by mouth daily as needed for Constipation. metoprolol succinate (TOPROL-XL) 25 mg XL tablet   Yes Yes   Sig: Take 25 mg by mouth daily. promethazine (PHENERGAN) 12.5 mg tablet   Yes Yes   Sig: Take  by mouth every six (6) hours as needed for Nausea. senna-docusate (SENEXON-S) 8.6-50 mg per tablet   Yes Yes   Sig: Take 1 Tab by mouth daily. sodium phosphate (DISPOSABLE ENEMA) 19-7 gram/118 mL enema   Yes Yes   Sig: Insert 1 Enema into rectum now.       Facility-Administered Medications: None          Thank you,  Coni Torrez, PHARMD

## 2017-04-03 NOTE — IP AVS SNAPSHOT
[address] [facility] 
[phone] Patient: Mary Lemons MRN: [id number] You are allergic to the following Allergen Reactions Codeine Itching Ibuprofen Other (comments)  
 kidney failure Pcn (Penicillins) Hives Sulfur Not Reported This Time Immunizations Administered for This Admission Name Date Tdap 2017 Recent Documentation Height Weight BMI OB Status Smoking Status 1.6 m 70.8 kg 27.63 kg/m2 Hysterectomy Former Smoker Emergency Contacts Name Discharge Info Relation Home Work Mobile Paulina Sample  Child [2]   [phone] About your hospitalization You were admitted on:  April 3, 2017 You last received care in the:  [facility] 3 ORTHOPEDICS You were discharged on:  2017 Unit phone number:  [phone] Why you were hospitalized Your primary diagnosis was:  Open Fracture Of Distal End Of Left Radius Your diagnoses also included:  Femur Fracture, Right (Hcc), Open Fracture Of Left Distal Radius Providers Seen During Your Hospitalizations Provider Role Specialty Primary office phone Ji Guzmán MD Attending Provider Emergency Medicine [phone] Jamie Soler MD Attending Provider Orthopedic Surgery [phone] Your Primary Care Physician (PCP) Primary Care Physician Office Phone Office Fax  
 Antoinetteyoni Morales Dave [phone] Follow-up Information Follow up With Details Comments Contact Info FAVIAN ([facility]) On 2017 This will be the provider of your therapy. If you have any questions, please contact them directly. [address] [place] [address] 
[phone] Jamie Soler MD Schedule an appointment as soon as possible for a visit in 10 days  [facility] [address] [facility] 
[phone] Asha Padilla MD On 5/11/2017 At 1:30 PM [facility] [address] [facility] 
[phone] Your Appointments Thursday May 11, 2017  1:30 PM EDT New Patient with MD Gregorio Olsen [facility]) [facility] [address] [address] [phone] Current Discharge Medication List  
  
START taking these medications Dose & Instructions Dispensing Information Comments Morning Noon Evening Bedtime  
 acetaminophen 325 mg tablet Commonly known as:  TYLENOL Replaces:  Mapap 500 mg Cap Your last dose was: Your next dose is:    
   
   
 Dose:  650 mg Take 2 Tabs by mouth every six (6) hours for 14 days. Quantity:  112 Tab Refills:  0  
     
   
   
   
  
 amLODIPine 5 mg tablet Commonly known as:  Sherald Burkitt Your last dose was: Your next dose is:    
   
   
 Dose:  5 mg Take 1 Tab by mouth daily. Quantity:  30 Tab Refills:  0  
     
   
   
   
  
 ascorbic acid (vitamin C) 500 mg tablet Commonly known as:  VITAMIN C Your last dose was: Your next dose is:    
   
   
 Dose:  500 mg Take 1 Tab by mouth two (2) times a day for 30 days. Quantity:  60 Tab Refills:  0  
     
   
   
   
  
 aspirin 325 mg tablet Commonly known as:  ASPIRIN Start taking on:  4/17/2017 Your last dose was: Your next dose is:    
   
   
 Dose:  325 mg Take 1 Tab by mouth daily for 14 days. Starting on 4/17/17 after completing two weeks of Lovenox Quantity:  28 Tab Refills:  0 Calcium Citrate-Vitamin D3 tablet Commonly known as:  Edward Villegas Your last dose was: Your next dose is:    
   
   
 Dose:  1 Tab Take 1 Tab by mouth two (2) times a day for 30 days. Quantity:  60 Tab Refills:  0 cholecalciferol 1,000 unit tablet Commonly known as:  VITAMIN D3 Your last dose was: Your next dose is:    
   
   
 Dose:  4000 Units Take 4 Tabs by mouth daily for 30 days. Quantity:  120 Tab Refills:  0  
     
   
   
   
  
 enoxaparin 40 mg/0.4 mL Commonly known as:  LOVENOX Your last dose was: Your next dose is:    
   
   
 Dose:  40 mg  
0.4 mL by SubCUTAneous route every twenty-four (24) hours for 11 days. Last dose 4/17/17  Indications: DEEP VEIN THROMBOSIS PREVENTION Quantity:  4.4 mL Refills:  0  
     
   
   
   
  
 oxyCODONE IR 5 mg immediate release tablet Commonly known as:  Radha Carrow Your last dose was: Your next dose is:    
   
   
 Dose:  5-10 mg Take 1-2 Tabs by mouth every four (4) hours as needed. Max Daily Amount: 60 mg.  
 Quantity:  30 Tab Refills:  0  
     
   
   
   
  
 polyethylene glycol 17 gram/dose powder Commonly known as:  Clora Oddi Your last dose was: Your next dose is:    
   
   
 Dose:  17 g Take 17 g by mouth daily for 15 days. Quantity:  255 g Refills:  0  
     
   
   
   
  
 traMADol 50 mg tablet Commonly known as:  ULTRAM  
   
Your last dose was: Your next dose is:    
   
   
 Dose:  50 mg Take 1 Tab by mouth every six (6) hours as needed. Max Daily Amount: 200 mg. Quantity:  30 Tab Refills:  0 CONTINUE these medications which have CHANGED Dose & Instructions Dispensing Information Comments Morning Noon Evening Bedtime SENEXON-S 8.6-50 mg per tablet Generic drug:  senna-docusate What changed:  when to take this Your last dose was: Your next dose is:    
   
   
 Dose:  1 Tab Take 1 Tab by mouth two (2) times a day. Refills:  0 CONTINUE these medications which have NOT CHANGED Dose & Instructions Dispensing Information Comments Morning Noon Evening Bedtime BISAC-EVAC 10 mg suppository Generic drug:  bisacodyl Your last dose was: Your next dose is:    
   
   
 Dose:  10 mg Insert 10 mg into rectum daily. Refills:  0  
     
   
   
   
  
 COMBIGAN 0.2-0.5 % Drop ophthalmic solution Generic drug:  brimonidine-timolol Your last dose was: Your next dose is:    
   
   
 Dose:  1 Drop Administer 1 Drop to right eye every twelve (12) hours. Refills:  0  
     
   
   
   
  
 DISPOSABLE ENEMA 19-7 gram/118 mL enema Generic drug:  sodium phosphate Your last dose was: Your next dose is:    
   
   
 Dose:  1 Enema Insert 1 Enema into rectum now. Refills:  0  
     
   
   
   
  
 levothyroxine 100 mcg tablet Commonly known as:  SYNTHROID Your last dose was: Your next dose is:    
   
   
 Dose:  100 mcg Take 100 mcg by mouth Daily (before breakfast). Refills:  0  
     
   
   
   
  
 metoprolol succinate 25 mg XL tablet Commonly known as:  TOPROL-XL Your last dose was: Your next dose is:    
   
   
 Dose:  25 mg Take 25 mg by mouth daily. Refills:  0 MILK OF MAGNESIA 400 mg/5 mL suspension Generic drug:  magnesium hydroxide Your last dose was: Your next dose is:    
   
   
 Dose:  30 mL Take 30 mL by mouth daily as needed for Constipation. Refills:  0  
     
   
   
   
  
 promethazine 12.5 mg tablet Commonly known as:  PHENERGAN Your last dose was: Your next dose is: Take  by mouth every six (6) hours as needed for Nausea. Refills:  0  
     
   
   
   
  
 VITAMIN D2 50,000 unit capsule Generic drug:  ergocalciferol Your last dose was: Your next dose is:    
   
   
 Dose:  72123 Units Take 50,000 Units by mouth every Sunday. Refills:  0 STOP taking these medications Mapap 500 mg Cap Generic drug:  acetaminophen Replaced by:  acetaminophen 325 mg tablet Where to Get Your Medications Information on where to get these meds will be given to you by the nurse or doctor. ! Ask your nurse or doctor about these medications  
  acetaminophen 325 mg tablet  
 amLODIPine 5 mg tablet  
 ascorbic acid (vitamin C) 500 mg tablet  
 aspirin 325 mg tablet Calcium Citrate-Vitamin D3 tablet  
 cholecalciferol 1,000 unit tablet  
 enoxaparin 40 mg/0.4 mL  
 oxyCODONE IR 5 mg immediate release tablet  
 polyethylene glycol 17 gram/dose powder  
 traMADol 50 mg tablet Discharge Instructions Serenity Fuchs MD 
Postoperative Instructions Right/Left Lower Extremity: 
 ___Non Weight Bearing    ___Postop Shoe 
 ___Heel touch weightbearing               ___CAM Boot 
 _X__Weightbearing as tolerated   ___Splint/Cast 
 
Dressing: 
 _X__Keep Dressing or Splint clean & dry 
 _X__Do Not remove dressing; Dressing will be changed at first postoperative visit. Notify                      office if there is concern for circulatory issues. ___Other:  
 
Job Hacking: ? You may shower 48 hours after your surgery. Do Not allow dressing or splint to get wet! Diet:  
? Advance diet as tolerated. You may experience nausea due to anesthesia, pain or pain medications. You should avoid spicy or heavy meals initially. Pain Management: ? Take medications as directed and use the lowest dose possible to control your pain. ? Do Not drive while taking narcotic pain medications. Elevation: 
? Strict elevation at or just above the level of your heart for the first 14 days after surgery. Limit time that foot/hand is in dependent position for trips to bathroom and kitchen as much as possible. Early control of swelling will improve future swelling.  
 
Ice:  
? __X___ Lynn Glenda may ice your surgical extremity for no longer than 20 minutes at a time, 3-4 times per day. Do Not apply ice directly to the skin. ? _____ Do Not apply ice to surgical extremity. Aspirin therapy:  
? Please DO NOT take any NSAIDs (Ibuprofen, Advil, Motrin, or Aleve) Call our office at [phone] if the following occur: ? Severe swelling, profuse bleeding or severe pain which does not improve with pain medication. ? Fever greater than 101.0; fevers less than this are very common in the first few days after surgery and are unlikely to indicate infection. Follow up: With Ortho in 2 weeks and with Dr. Aliya Palmer as scheduled on May 11th at 1:30 Additional Instructions: N/A Discharge Orders None [facility] Announcement We are excited to announce that we are making your provider's discharge notes available to you in [facility]. You will see these notes when they are completed and signed by the physician that discharged you from your recent hospital stay. If you have any questions or concerns about any information you see in [facility], please call the Health Information Department where you were seen or reach out to your Primary Care Provider for more information about your plan of care. Introducing hospitals & HEALTH SERVICES! [employer] introduces [facility] patient portal. Now you can access parts of your medical record, email your doctor's office, and request medication refills online. 1. In your internet browser, go to [email or web address] 2. Click on the First Time User? Click Here link in the Sign In box. You will see the New Member Sign Up page. 3. Enter your [facility] Access Code exactly as it appears below. You will not need to use this code after youve completed the sign-up process. If you do not sign up before the expiration date, you must request a new code. · [facility] Access Code: [email or web address] Expires: 7/2/2017  3:54 PM 
 
4.  Enter the last four digits of your Social Security Number (xxxx) and Date of Birth (mm/dd/yyyy) as indicated and click Submit. You will be taken to the next sign-up page. 5. Create a [employer]ocean ID. This will be your [employer]ocean login ID and cannot be changed, so think of one that is secure and easy to remember. 6. Create a [employer] password. You can change your password at any time. 7. Enter your Password Reset Question and Answer. This can be used at a later time if you forget your password. 8. Enter your e-mail address. You will receive e-mail notification when new information is available in [address] 9. Click Sign Up. You can now view and download portions of your medical record. 10. Click the Download Summary menu link to download a portable copy of your medical information. If you have questions, please visit the Frequently Asked Questions section of the [employer] website. Remember, [employer] is NOT to be used for urgent needs. For medical emergencies, dial 911. Now available from your iPhone and Android! General Information Please provide this summary of care documentation to your next provider. Patient Signature:  ____________________________________________________________ Date:  ____________________________________________________________  
  
Jayesh Neville Provider Signature:  ____________________________________________________________ Date:  ____________________________________________________________

## 2017-04-03 NOTE — ED PROVIDER NOTES
HPI Comments: Saqib Barba is a 80 y.o. Female with hx left hip fracture, HTN, who presents via EMS to the ED for evaluation following a fall x today. Per EMS, she was in a parking lot helping her sister get out of the car and into her wheelchair, when she fell backwards to the ground. She denies head injury or LOC. Per EMS, she reports RLE pain, left wrist pain, and her BP - 170/100. She specifically denies dizziness or chest pain prior to her fall. PCP: Jacquelin Thomas MD  Soc Hx: -tobacco, -EtOH    There are no other complaints, changes or physical findings at this time. The history is provided by the patient and the EMS personnel. Past Medical History:   Diagnosis Date    Actinic keratoses 3/19/2010    Cholelithiases 3/19/2010    Decreased hearing 3/19/2010    Diffuse cystic mastopathy     DM type 2 (diabetes mellitus, type 2) ([facility] [address] 3/19/2010    Elevated C-reactive protein (CRP)     Glaucoma 3/19/2010    History of kidney stones     HTN (hypertension) 3/19/2010    Macular puckering of retina, right eye     Stress incontinence 3/19/2010    Unspecified hypothyroidism     Vitamin D deficiency 10/6/2010       Past Surgical History:   Procedure Laterality Date    ENDOSCOPY, COLON, DIAGNOSTIC  06    HX APPENDECTOMY      HX CATARACT REMOVAL      HX CYST INCISION AND DRAINAGE      left breast x 2    HX DINESH AND BSO      over several procedures    HX TONSILLECTOMY      HX TRABECULECTOMY  1990    laser OD         Family History:   Problem Relation Age of Onset    Heart Disease Mother     Cancer Sister      colon at 48    Cancer Brother      lung    Cancer Brother      leukemia    Cancer Sister      gallbladder  12    Diabetes Sister        Social History     Social History    Marital status:      Spouse name: Marley Hardy,      Number of children: N/A    Years of education: N/A     Occupational History    Not on file.      Social History Main Topics    Smoking status: Former Smoker     Packs/day: 1.50     Years: 20.00     Quit date: 1/1/1982    Smokeless tobacco: Never Used    Alcohol use No    Drug use: No    Sexual activity: Not Currently     Other Topics Concern    Not on file     Social History Narrative         ALLERGIES: Codeine; Ibuprofen; Pcn [penicillins]; and Sulfur    Review of Systems   Constitutional: Negative for activity change, appetite change, chills, fever and unexpected weight change. HENT: Negative for congestion. Eyes: Negative for pain and visual disturbance. Respiratory: Negative for cough and shortness of breath. Cardiovascular: Negative for chest pain. Gastrointestinal: Negative for abdominal pain, diarrhea, nausea and vomiting. Genitourinary: Negative for dysuria. Musculoskeletal: Positive for arthralgias. Negative for back pain. Skin: Negative for rash. Neurological: Negative for dizziness and headaches. All other systems reviewed and are negative. Vitals:    04/03/17 1522   BP: (!) 252/80   Pulse: 60   Resp: 13   Temp: 98.1 °F (36.7 °C)   SpO2: 97%   Weight: 70.8 kg (156 lb)   Height: 5' 3\" (1.6 m)            Physical Exam   Constitutional: She is oriented to person, place, and time. She appears well-developed and well-nourished. She appears distressed. Minimal distress   HENT:   Head: Normocephalic and atraumatic. Mouth/Throat: Oropharynx is clear and moist.   Eyes: Conjunctivae and EOM are normal. Pupils are equal, round, and reactive to light. Right eye exhibits no discharge. Left eye exhibits no discharge. Neck: Normal range of motion. Neck supple. Cardiovascular: Normal rate, normal heart sounds and intact distal pulses. No murmur heard. Distal pulses equal   Pulmonary/Chest: Effort normal and breath sounds normal. No respiratory distress. She has no wheezes. She has no rales. Abdominal: Soft. Bowel sounds are normal. She exhibits no distension. There is no tenderness. Musculoskeletal: She exhibits tenderness and deformity. Right mid-femur deformity with edema, shortened and internally rotated leg. Left dorsal wrist swelling with tenderness. Neurological: She is alert and oriented to person, place, and time. No cranial nerve deficit. She exhibits normal muscle tone. Skin: Skin is warm and dry. No rash noted. She is not diaphoretic. Abrasion to right dorsal elbow   Nursing note and vitals reviewed. MDM  Number of Diagnoses or Management Options  Closed displaced comminuted fracture of shaft of right femur, initial encounter:   Radius and ulna distal fracture, left, closed, initial encounter:   Diagnosis management comments: DDx: Unknown etiology to fall, does not sound cardiac in nature. No evidence of head injury. Extremity fracture with pre-op evaluation. Amount and/or Complexity of Data Reviewed  Clinical lab tests: ordered and reviewed  Tests in the radiology section of CPT®: ordered and reviewed  Tests in the medicine section of CPT®: ordered and reviewed  Obtain history from someone other than the patient: yes (EMS)  Review and summarize past medical records: yes  Discuss the patient with other providers: yes (Orthopedics, hospitalist)  Independent visualization of images, tracings, or specimens: yes      ED Course       Procedures  5:00 PM Discussed results with patient and family. SBP elevated, but patient states she is comfortable. Call to Ortho, await call back. CONSULT NOTE:   5:10 PM  Karli Palomares MD spoke with Vincenzo Carrillo NP  Specialty: Orthopedics  Discussed pt's hx, disposition, and available diagnostic and imaging results. Reviewed care plans. Consultant agrees with plans as outlined. She will evaluate the pt in the ED.   Written by Hiral Reid ED Scribe, as dictated by Karli Palomares MD.    CONSULT NOTE:   5:20 PM  Karli Palomares MD spoke with Dr Guanakito Marquez,   Specialty: Hospitalist  Discussed pt's hx, disposition, and available diagnostic and imaging results. Reviewed care plans. Consultant will evaluate pt for admission. Written by JOSEP Griffin, as dictated by Nu Julian MD.    Procedure Note - Procedural Sedation:   7:20 PM  Procedure performed by: Nu Julian MD  Assistants: Grayson Esparza  Procedural sedation has been advised for this patient associated with fractures. PLAN FOR SEDATION:  The patients sedation plan includes a total of 130mg propofol/DIPRIVAN. Reversal agents and resuscitation equipment will be at the bedside should they be needed. The risks, benefits, and alternatives have been explained to the patient and She consents to the sedation. The ASA score is ASA 2 - Mild systemic disease. The patient is having all vital signs monitored by an attending RN as well as pulse oximetry. Mallampati Score Reference: The patient has a patent airway with a Mallampati Classification Score of II (soft palate, uvula, fauces visible). IMMEDIATE REASSESSMENT PRIOR TO PROCEDURE:  TIME: [address]   Immediately prior to the procedure, the patient was re-evaluated and found suitable for the planned procedure and any planned medications. ____________________________________________________________________________________________________________________________________________    Post Procedure Anaesthesia/Sedation Assessment #1  TIME: 1915  Post Procedure assessment performed by: Nu Julian MD  Patient Vitals for the past 12 hrs:   Temp Pulse Resp BP SpO2   04/03/17 1845 - 60 14 196/60 95 %   04/03/17 1522 98.1 °F (36.7 °C) 60 13 (!) 252/80 97 %     Airway patent?: Yes  Hydration Status: normal  Mental Status: alert and oriented, able to answer questions appropriately  Pain Level:0    Intraprocedure/postprocedure Complications: none  EBL: 0  The procedure took 20 minutes, and pt tolerated well.     During this post sedation period, in addition to the recovering RN, there has been at least one other staff member in the unit. This additional staff member was able to directly hear a call for assistance in the case of an emergency. Written by Parish Pichardo ED Scribe, as dictated by Love Hurd MD.   ---------------------------------------------------------------------------------------------------------------------    19:40 Doing well, Sitting up in bed and sates pain remains 0/10. Patient to go to OR for repairs. Family at bedside. LABORATORY TESTS:  Recent Results (from the past 12 hour(s))   EKG, 12 LEAD, INITIAL    Collection Time: 04/03/17  3:40 PM   Result Value Ref Range    Ventricular Rate 63 BPM    Atrial Rate 63 BPM    P-R Interval 184 ms    QRS Duration 88 ms    Q-T Interval 434 ms    QTC Calculation (Bezet) 444 ms    Calculated P Axis 12 degrees    Calculated R Axis -15 degrees    Calculated T Axis 44 degrees    Diagnosis       Sinus rhythm with marked sinus arrhythmia with occasional premature   ventricular complexes  Left ventricular hypertrophy with repolarization abnormality  When compared with ECG of 28-MAY-2016 15:36,  premature ventricular complexes are now present  premature atrial complexes are no longer present     CBC WITH AUTOMATED DIFF    Collection Time: 04/03/17  4:02 PM   Result Value Ref Range    WBC 12.4 (H) 3.6 - 11.0 K/uL    RBC 5.24 (H) 3.80 - 5.20 M/uL    HGB 12.9 11.5 - 16.0 g/dL    HCT 41.5 35.0 - 47.0 %    MCV 79.2 (L) 80.0 - 99.0 FL    MCH 24.6 (L) 26.0 - 34.0 PG    MCHC 31.1 30.0 - 36.5 g/dL    RDW 16.4 (H) 11.5 - 14.5 %    PLATELET 083 311 - 698 K/uL    NEUTROPHILS 74 32 - 75 %    LYMPHOCYTES 15 12 - 49 %    MONOCYTES 9 5 - 13 %    EOSINOPHILS 2 0 - 7 %    BASOPHILS 0 0 - 1 %    ABS. NEUTROPHILS 9.1 (H) 1.8 - 8.0 K/UL    ABS. LYMPHOCYTES 1.9 0.8 - 3.5 K/UL    ABS. MONOCYTES 1.1 (H) 0.0 - 1.0 K/UL    ABS. EOSINOPHILS 0.3 0.0 - 0.4 K/UL    ABS.  BASOPHILS 0.1 0.0 - 0.1 K/UL   METABOLIC PANEL, COMPREHENSIVE Collection Time: 04/03/17  4:02 PM   Result Value Ref Range    Sodium 141 136 - 145 mmol/L    Potassium 3.2 (L) 3.5 - 5.1 mmol/L    Chloride 103 97 - 108 mmol/L    CO2 30 21 - 32 mmol/L    Anion gap 8 5 - 15 mmol/L    Glucose 155 (H) 65 - 100 mg/dL    BUN 19 6 - 20 MG/DL    Creatinine 0.99 0.55 - 1.02 MG/DL    BUN/Creatinine ratio 19 12 - 20      GFR est AA >60 >60 ml/min/1.73m2    GFR est non-AA 53 (L) >60 ml/min/1.73m2    Calcium 8.7 8.5 - 10.1 MG/DL    Bilirubin, total 0.6 0.2 - 1.0 MG/DL    ALT (SGPT) 18 12 - 78 U/L    AST (SGOT) 23 15 - 37 U/L    Alk. phosphatase 127 (H) 45 - 117 U/L    Protein, total 8.4 (H) 6.4 - 8.2 g/dL    Albumin 3.8 3.5 - 5.0 g/dL    Globulin 4.6 (H) 2.0 - 4.0 g/dL    A-G Ratio 0.8 (L) 1.1 - 2.2         IMAGING RESULTS:  XR FEMUR RT 2 VS   Final Result   EXAM: XR FEMUR RT 2 VS     INDICATION: mid shaft femur fx.     COMPARISON: None.     FINDINGS: Two views of the right femur on 5 images demonstrate a comminuted  fracture of the right femur centered at the junction of the middle and distal  thirds demonstrating 100% medial displacement associated with up to 5 cm  telescoping. There is moderate medial angular deformity. No dislocation is  shown. The bones are severely osteopenic. Mild osteoarthrosis of the right knee  and moderate osteoarthrosis of the right hip is demonstrated.      IMPRESSION  IMPRESSION: Comminuted fracture of right femur at junction of middle and distal  thirds with 100% displacement and mild shortening. XR ELBOW LT AP/LAT   Final Result   EXAM: XR ELBOW LT AP/LAT     INDICATION: Trauma. Left elbow pain.     COMPARISON: None.     FINDINGS: Two views of the left elbow demonstrate severe diffuse osteopenia  without acute fracture or dislocation. No elbow effusion is shown.     IMPRESSION  IMPRESSION: No acute abnormality. XR WRIST LT AP/LAT/OBL MIN 3V   Final Result   EXAM: XR WRIST LT AP/LAT/OBL MIN 3V     INDICATION: Trauma.  Left wrist pain     COMPARISON: [Radiographs 2/23/2015.     FINDINGS: Three views of the left wrist demonstrate severe diffuse osteopenia  with an interval comminuted articular fracture of the distal radius  demonstrating 100% dorsal displacement with up to 5-6 mm overriding. The distal  articular margin shows moderate dorsal annular deformity. Note dislocation is  shown. Fracture at the base of the ulnar styloid process is also demonstrated. Moderate to severe first carpal metacarpal osteoarthrosis and polyarticular  digital osteoarthrosis is again shown. Chondrocalcinosis at the medial  radiocarpal joint is again demonstrated.     IMPRESSION  IMPRESSION: Intra-articular fracture distal radius with 100% dorsal  displacement and shortening demonstrated. XR CHEST SNGL V   Final Result   EXAM: XR CHEST SNGL V     INDICATION: Preoperative evaluation, history of hypertension     COMPARISON: 5/28/2016.     FINDINGS: A single view of the chest demonstrates clear lungs. The cardiac and  mediastinal contours and pulmonary vascularity are normal. The bones and soft  tissues are within normal limits.      IMPRESSION  IMPRESSION: No acute abnormality. MEDICATIONS GIVEN:  Medications   propofol (DIPRIVAN) 10 mg/mL injection 70.8 mg (30 mg IntraVENous New Bag 4/3/17 1908)   morphine injection 4 mg (4 mg IntraVENous Given 4/3/17 1555)   ondansetron (ZOFRAN) injection 4 mg (4 mg IntraVENous Given 4/3/17 1554)       IMPRESSION:  1. Closed displaced comminuted fracture of shaft of right femur, initial encounter    2. Radius and ulna distal fracture, left, closed, initial encounter        PLAN:  1. Admit pt to orthopedic surgery. Admission Note:  7:56 PM  Patient is being admitted to the hospital by Dr. Ashley Maddox. The results of their tests and reasons for their admission have been discussed with them and available family. They convey agreement and understanding for the need to be admitted and for their admission diagnosis.    Written by Dilip Michael JOSEP Cartyibe, as dictated by Roberto Garner MD.    This note is prepared by Urszula White acting as Scribe for Roberto Garner MD.    Roberto Garner MD: The scribe's documentation has been prepared under my direction and personally reviewed by me in its entirety. I confirm that the note above accurately reflects all work, treatment, procedures, and medical decision making performed by me.

## 2017-04-03 NOTE — ED NOTES
Patient presents to ED with C/O a fall that happened today. The pt stated she got out the car, twisted her knee, and fell. The pt denies LOC, hitting her head, diarrhea, chest pain, dizziness, N/V, fever, and chills. Patient is A&Ox3, side rails up, call bell w/in reach, and aware of plan of care. The patient is in NAD.

## 2017-04-04 ENCOUNTER — APPOINTMENT (OUTPATIENT)
Dept: GENERAL RADIOLOGY | Age: 82
DRG: 481 | End: 2017-04-04
Attending: ORTHOPAEDIC SURGERY
Payer: MEDICARE

## 2017-04-04 LAB
ANION GAP BLD CALC-SCNC: 12 MMOL/L (ref 5–15)
ATRIAL RATE: 63 BPM
BUN SERPL-MCNC: 16 MG/DL (ref 6–20)
BUN/CREAT SERPL: 20 (ref 12–20)
CALCIUM SERPL-MCNC: 8.2 MG/DL (ref 8.5–10.1)
CALCULATED P AXIS, ECG09: 12 DEGREES
CALCULATED R AXIS, ECG10: -15 DEGREES
CALCULATED T AXIS, ECG11: 44 DEGREES
CHLORIDE SERPL-SCNC: 103 MMOL/L (ref 97–108)
CO2 SERPL-SCNC: 29 MMOL/L (ref 21–32)
CREAT SERPL-MCNC: 0.8 MG/DL (ref 0.55–1.02)
DIAGNOSIS, 93000: NORMAL
ERYTHROCYTE [DISTWIDTH] IN BLOOD BY AUTOMATED COUNT: 16.3 % (ref 11.5–14.5)
GLUCOSE BLD STRIP.AUTO-MCNC: 142 MG/DL (ref 65–100)
GLUCOSE BLD STRIP.AUTO-MCNC: 153 MG/DL (ref 65–100)
GLUCOSE SERPL-MCNC: 118 MG/DL (ref 65–100)
HCT VFR BLD AUTO: 33 % (ref 35–47)
HGB BLD-MCNC: 10.7 G/DL (ref 11.5–16)
MCH RBC QN AUTO: 25.7 PG (ref 26–34)
MCHC RBC AUTO-ENTMCNC: 32.4 G/DL (ref 30–36.5)
MCV RBC AUTO: 79.1 FL (ref 80–99)
P-R INTERVAL, ECG05: 184 MS
PLATELET # BLD AUTO: 209 K/UL (ref 150–400)
POTASSIUM SERPL-SCNC: 3.2 MMOL/L (ref 3.5–5.1)
Q-T INTERVAL, ECG07: 434 MS
QRS DURATION, ECG06: 88 MS
QTC CALCULATION (BEZET), ECG08: 444 MS
RBC # BLD AUTO: 4.17 M/UL (ref 3.8–5.2)
SERVICE CMNT-IMP: ABNORMAL
SERVICE CMNT-IMP: ABNORMAL
SODIUM SERPL-SCNC: 144 MMOL/L (ref 136–145)
VENTRICULAR RATE, ECG03: 63 BPM
WBC # BLD AUTO: 15.2 K/UL (ref 3.6–11)

## 2017-04-04 PROCEDURE — 3E0234Z INTRODUCTION OF SERUM, TOXOID AND VACCINE INTO MUSCLE, PERCUTANEOUS APPROACH: ICD-10-PCS | Performed by: ORTHOPAEDIC SURGERY

## 2017-04-04 PROCEDURE — 80048 BASIC METABOLIC PNL TOTAL CA: CPT | Performed by: ORTHOPAEDIC SURGERY

## 2017-04-04 PROCEDURE — G8979 MOBILITY GOAL STATUS: HCPCS

## 2017-04-04 PROCEDURE — 74011250637 HC RX REV CODE- 250/637: Performed by: INTERNAL MEDICINE

## 2017-04-04 PROCEDURE — 36415 COLL VENOUS BLD VENIPUNCTURE: CPT | Performed by: ORTHOPAEDIC SURGERY

## 2017-04-04 PROCEDURE — 77030011256 HC DRSG MEPILEX <16IN NO BORD MOLN -A

## 2017-04-04 PROCEDURE — 74011250637 HC RX REV CODE- 250/637: Performed by: ORTHOPAEDIC SURGERY

## 2017-04-04 PROCEDURE — 77010033678 HC OXYGEN DAILY

## 2017-04-04 PROCEDURE — 74011250636 HC RX REV CODE- 250/636: Performed by: ORTHOPAEDIC SURGERY

## 2017-04-04 PROCEDURE — 74011000250 HC RX REV CODE- 250: Performed by: NURSE PRACTITIONER

## 2017-04-04 PROCEDURE — 73552 X-RAY EXAM OF FEMUR 2/>: CPT

## 2017-04-04 PROCEDURE — 85027 COMPLETE CBC AUTOMATED: CPT | Performed by: ORTHOPAEDIC SURGERY

## 2017-04-04 PROCEDURE — 90715 TDAP VACCINE 7 YRS/> IM: CPT | Performed by: NURSE PRACTITIONER

## 2017-04-04 PROCEDURE — 65270000029 HC RM PRIVATE

## 2017-04-04 PROCEDURE — G8978 MOBILITY CURRENT STATUS: HCPCS

## 2017-04-04 PROCEDURE — 97162 PT EVAL MOD COMPLEX 30 MIN: CPT

## 2017-04-04 PROCEDURE — 73120 X-RAY EXAM OF HAND: CPT

## 2017-04-04 PROCEDURE — 74011250637 HC RX REV CODE- 250/637: Performed by: NURSE PRACTITIONER

## 2017-04-04 PROCEDURE — 82962 GLUCOSE BLOOD TEST: CPT

## 2017-04-04 PROCEDURE — 73130 X-RAY EXAM OF HAND: CPT

## 2017-04-04 PROCEDURE — 73080 X-RAY EXAM OF ELBOW: CPT

## 2017-04-04 PROCEDURE — 74011250636 HC RX REV CODE- 250/636: Performed by: NURSE PRACTITIONER

## 2017-04-04 PROCEDURE — 73100 X-RAY EXAM OF WRIST: CPT

## 2017-04-04 PROCEDURE — 97530 THERAPEUTIC ACTIVITIES: CPT

## 2017-04-04 RX ORDER — TRAMADOL HYDROCHLORIDE 50 MG/1
50 TABLET ORAL
Status: DISCONTINUED | OUTPATIENT
Start: 2017-04-04 | End: 2017-04-06 | Stop reason: HOSPADM

## 2017-04-04 RX ORDER — OXYCODONE HYDROCHLORIDE 5 MG/1
10 TABLET ORAL
Status: DISCONTINUED | OUTPATIENT
Start: 2017-04-04 | End: 2017-04-06

## 2017-04-04 RX ORDER — SODIUM CHLORIDE 0.9 % (FLUSH) 0.9 %
5-10 SYRINGE (ML) INJECTION EVERY 8 HOURS
Status: DISCONTINUED | OUTPATIENT
Start: 2017-04-04 | End: 2017-04-06 | Stop reason: HOSPADM

## 2017-04-04 RX ORDER — ACETAMINOPHEN 325 MG/1
650 TABLET ORAL EVERY 6 HOURS
Status: DISCONTINUED | OUTPATIENT
Start: 2017-04-04 | End: 2017-04-06 | Stop reason: HOSPADM

## 2017-04-04 RX ORDER — POTASSIUM CHLORIDE 20 MEQ/1
40 TABLET, EXTENDED RELEASE ORAL
Status: COMPLETED | OUTPATIENT
Start: 2017-04-04 | End: 2017-04-04

## 2017-04-04 RX ORDER — BRIMONIDINE TARTRATE 2 MG/ML
1 SOLUTION/ DROPS OPHTHALMIC 2 TIMES DAILY
Status: DISCONTINUED | OUTPATIENT
Start: 2017-04-04 | End: 2017-04-06 | Stop reason: HOSPADM

## 2017-04-04 RX ORDER — NALOXONE HYDROCHLORIDE 0.4 MG/ML
0.4 INJECTION, SOLUTION INTRAMUSCULAR; INTRAVENOUS; SUBCUTANEOUS AS NEEDED
Status: DISCONTINUED | OUTPATIENT
Start: 2017-04-04 | End: 2017-04-06 | Stop reason: HOSPADM

## 2017-04-04 RX ORDER — ONDANSETRON 2 MG/ML
4 INJECTION INTRAMUSCULAR; INTRAVENOUS
Status: DISCONTINUED | OUTPATIENT
Start: 2017-04-04 | End: 2017-04-06 | Stop reason: HOSPADM

## 2017-04-04 RX ORDER — MORPHINE SULFATE 2 MG/ML
2 INJECTION, SOLUTION INTRAMUSCULAR; INTRAVENOUS
Status: DISCONTINUED | OUTPATIENT
Start: 2017-04-04 | End: 2017-04-06 | Stop reason: HOSPADM

## 2017-04-04 RX ORDER — SODIUM CHLORIDE 0.9 % (FLUSH) 0.9 %
5-10 SYRINGE (ML) INJECTION AS NEEDED
Status: DISCONTINUED | OUTPATIENT
Start: 2017-04-04 | End: 2017-04-06 | Stop reason: HOSPADM

## 2017-04-04 RX ORDER — ACETAMINOPHEN 325 MG/1
650 TABLET ORAL
Status: DISCONTINUED | OUTPATIENT
Start: 2017-04-04 | End: 2017-04-04

## 2017-04-04 RX ORDER — ENOXAPARIN SODIUM 100 MG/ML
40 INJECTION SUBCUTANEOUS EVERY 24 HOURS
Status: DISCONTINUED | OUTPATIENT
Start: 2017-04-04 | End: 2017-04-06 | Stop reason: HOSPADM

## 2017-04-04 RX ORDER — CEFAZOLIN SODIUM IN 0.9 % NACL 2 G/100 ML
2 PLASTIC BAG, INJECTION (ML) INTRAVENOUS EVERY 8 HOURS
Status: COMPLETED | OUTPATIENT
Start: 2017-04-04 | End: 2017-04-04

## 2017-04-04 RX ORDER — OXYCODONE HYDROCHLORIDE 5 MG/1
5 TABLET ORAL
Status: DISCONTINUED | OUTPATIENT
Start: 2017-04-04 | End: 2017-04-06

## 2017-04-04 RX ADMIN — ACETAMINOPHEN 650 MG: 325 TABLET, FILM COATED ORAL at 21:58

## 2017-04-04 RX ADMIN — Medication 10 ML: at 21:59

## 2017-04-04 RX ADMIN — DOCUSATE SODIUM AND SENNOSIDES 2 TABLET: 8.6; 5 TABLET, FILM COATED ORAL at 17:32

## 2017-04-04 RX ADMIN — POTASSIUM CHLORIDE 40 MEQ: 20 TABLET, EXTENDED RELEASE ORAL at 10:01

## 2017-04-04 RX ADMIN — Medication 10 ML: at 06:31

## 2017-04-04 RX ADMIN — ACETAMINOPHEN 650 MG: 325 TABLET, FILM COATED ORAL at 04:21

## 2017-04-04 RX ADMIN — OXYCODONE HYDROCHLORIDE 5 MG: 5 TABLET ORAL at 21:59

## 2017-04-04 RX ADMIN — OXYCODONE HYDROCHLORIDE 5 MG: 5 TABLET ORAL at 10:02

## 2017-04-04 RX ADMIN — SODIUM CHLORIDE 75 ML/HR: 900 INJECTION, SOLUTION INTRAVENOUS at 01:24

## 2017-04-04 RX ADMIN — ACETAMINOPHEN 650 MG: 325 TABLET, FILM COATED ORAL at 17:32

## 2017-04-04 RX ADMIN — DOCUSATE SODIUM AND SENNOSIDES 2 TABLET: 8.6; 5 TABLET, FILM COATED ORAL at 10:01

## 2017-04-04 RX ADMIN — METOPROLOL SUCCINATE 25 MG: 25 TABLET, EXTENDED RELEASE ORAL at 10:02

## 2017-04-04 RX ADMIN — LEVOTHYROXINE SODIUM 100 MCG: 100 TABLET ORAL at 10:01

## 2017-04-04 RX ADMIN — ENOXAPARIN SODIUM 40 MG: 40 INJECTION SUBCUTANEOUS at 10:28

## 2017-04-04 RX ADMIN — ACETAMINOPHEN 650 MG: 325 TABLET, FILM COATED ORAL at 10:01

## 2017-04-04 RX ADMIN — OXYCODONE HYDROCHLORIDE 5 MG: 5 TABLET ORAL at 17:32

## 2017-04-04 RX ADMIN — ONDANSETRON HYDROCHLORIDE 4 MG: 2 INJECTION, SOLUTION INTRAMUSCULAR; INTRAVENOUS at 02:11

## 2017-04-04 RX ADMIN — TETANUS TOXOID, REDUCED DIPHTHERIA TOXOID AND ACELLULAR PERTUSSIS VACCINE, ADSORBED 0.5 ML: 5; 2.5; 8; 8; 2.5 SUSPENSION INTRAMUSCULAR at 12:35

## 2017-04-04 RX ADMIN — BRIMONIDINE TARTRATE 1 DROP: 2 SOLUTION/ DROPS OPHTHALMIC at 10:15

## 2017-04-04 RX ADMIN — Medication 10 ML: at 13:56

## 2017-04-04 RX ADMIN — CEFAZOLIN 2 G: 10 INJECTION, POWDER, FOR SOLUTION INTRAVENOUS; PARENTERAL at 06:30

## 2017-04-04 RX ADMIN — BRIMONIDINE TARTRATE 1 DROP: 2 SOLUTION/ DROPS OPHTHALMIC at 21:59

## 2017-04-04 RX ADMIN — CEFAZOLIN 2 G: 10 INJECTION, POWDER, FOR SOLUTION INTRAVENOUS; PARENTERAL at 13:55

## 2017-04-04 NOTE — PROGRESS NOTES
Ortho/ NeuroSurgery NP Note    POD# 1  s/p FEMUR OPEN REDUCTION INTERNAL FIXATION, Synthes Nail,irrigation and debridement, closed reduction and casting left wrist; debridement and wound closure right elbow     Pt resting in bed. No complaints. Reports her left hand hurts. VSS Afebrile. HTN. Durán in place. Will remove when mobilized. Labs  Lab Results   Component Value Date/Time    HGB 12.9 04/03/2017 04:02 PM      Lab Results   Component Value Date/Time    INR 1.0 05/28/2016 03:52 PM        Dressing c.d.i, cryotherapy   Calves soft and supple; No pain with passive stretch  Sensation and motor intact  SCDs for mechanical DVT proph while in bed     PLAN:  1) PT BID  2) Lovenox for DVT Prophylaxis  3) Plan d/c based on PT recommendations.     Niki Montalvo, YAJAIRA

## 2017-04-04 NOTE — CONSULTS
Hospitalist Consultation Note    NAME:  Karthik Nick   :   1927   MRN:   [id number]     ATTENDING: Sabrina Butler MD  PCP:  Monique Valdez MD    Date/Time:  4/3/2017 8:04 PM      Recommendations/Plan:     Comminuted fracture of right femur   L distal radius fracture  -plan for ORIF of R femur with I&D of open L radius fracture  Pre-op cardiac risk assessment:  Pt evaluated using revised cardiac risk index and is felt to be moderate cardiovascular risk for intermediate  risk surgery with a 1-2.4 risk for major complications based on these criteria. This risk has been discussed with the patient her daughter and pt wishes to proceed. Plan for surgery without further cardiac testing if this risk is acceptable per surgery and anesthesia. Further risk reduction will involve medical management of other comorbid conditions in the perioperative period. -xr of R femur showed communited fracture or R femur at junction of middle and distal 3rds with 100% displacement and mild shortening  -pain management and dvt prophylaxis per by ortho  -gentle IVF    Accelerated HTN  -likely induced by pain   -give hydralazine 10mg IV now  -resuming BB. Hydralazine prn     R eye glaucoma  -continue eye drop    Hypothyroidism  -continue pta synthroid    Hx of T2DM  -not on meds currently.    -will check A1C  -SSI      Code Status: DNR          Subjective:   REASON FOR CONSULT:  gabby Hernández is a 80 y.o.  female who is admitted for R midshalf femur fracture and L distal radius fracture. Pt has hx of HTN, R eye glaucoma, hypothyroidism, vit D deficiency, present to ED after she suffered a fall while helping her sister out of the car. Pt resides at assisted living facility. Normally compliance with meds. We were asked for medical clearance and management of DM and HTN.       Past Medical History:   Diagnosis Date    Actinic keratoses 3/19/2010    Cholelithiases 3/19/2010    Decreased hearing 3/19/2010  Diffuse cystic mastopathy     Elevated C-reactive protein (CRP)     Glaucoma 3/19/2010    History of kidney stones     HTN (hypertension) 3/19/2010    Macular puckering of retina, right eye     Stress incontinence 3/19/2010    Unspecified hypothyroidism     Vitamin D deficiency 10/6/2010      Past Surgical History:   Procedure Laterality Date    ENDOSCOPY, COLON, DIAGNOSTIC  06    HX APPENDECTOMY      HX CATARACT REMOVAL      HX CYST INCISION AND DRAINAGE      left breast x 2    HX DINESH AND BSO      over several procedures    HX TONSILLECTOMY      HX TRABECULECTOMY  1990    laser OD     Social History   Substance Use Topics    Smoking status: Former Smoker     Packs/day: 1.50     Years: 20.00     Quit date: 1982    Smokeless tobacco: Never Used    Alcohol use No      Family History   Problem Relation Age of Onset    Heart Disease Mother     Cancer Brother      lung    Cancer Sister      gallbladder  12    Diabetes Sister     Cancer Sister      colon at 48    Cancer Brother      leukemia       Allergies   Allergen Reactions    Codeine Itching    Ibuprofen Other (comments)     kidney failure    Pcn [Penicillins] Hives    Sulfur Not Reported This Time      Prior to Admission medications    Medication Sig Start Date End Date Taking? Authorizing Provider   brimonidine-timolol (COMBIGAN) 0.2-0.5 % drop ophthalmic solution Administer 1 Drop to right eye every twelve (12) hours. Yes Tyler Muller MD   bisacodyl (BISAC-EVAC) 10 mg suppository Insert 10 mg into rectum daily. Yes Tyler Muller MD   sodium phosphate (DISPOSABLE ENEMA) 19-7 gram/118 mL enema Insert 1 Enema into rectum now. Yes Tyler Muller MD   acetaminophen (MAPAP) 500 mg cap Take 1-2 Tabs by mouth every six (6) hours as needed. Yes Tyler Muller MD   magnesium hydroxide (MILK OF MAGNESIA) 400 mg/5 mL suspension Take 30 mL by mouth daily as needed for Constipation.    Yes Tyler Muller MD   promethazine (PHENERGAN) 12.5 mg tablet Take  by mouth every six (6) hours as needed for Nausea. Yes Tyler Muller MD   senna-docusate (SENEXON-S) 8.6-50 mg per tablet Take 1 Tab by mouth daily. Yes Tyler Muller MD   metoprolol succinate (TOPROL-XL) 25 mg XL tablet Take 25 mg by mouth daily. Yes Tyler Muller MD   ergocalciferol (VITAMIN D2) 50,000 unit capsule Take 50,000 Units by mouth every Sunday. Yes Tyler Muller MD   levothyroxine (SYNTHROID) 100 mcg tablet Take 100 mcg by mouth Daily (before breakfast). Yes Tyler Muller MD       REVIEW OF SYSTEMS:     Total of 12 systems reviewed as follows:   I am not able to complete the review of systems because:    The patient is intubated and sedated    The patient has altered mental status due to his acute medical problems    The patient has baseline aphasia from prior stroke(s)    The patient has baseline dementia and is not reliable historian                 POSITIVE= underlined text  Negative = text not underlined  General:  fever, chills, sweats, generalized weakness, weight loss/gain,      loss of appetite   Eyes:    blurred vision, eye pain, loss of vision, double vision  ENT:    rhinorrhea, pharyngitis   Respiratory:   cough, sputum production, SOB, wheezing, GUTIERRES, pleuritic pain   Cardiology:   chest pain, palpitations, orthopnea, PND, edema, syncope   Gastrointestinal:  abdominal pain , N/V, dysphagia, diarrhea, constipation, bleeding   Genitourinary:  frequency, urgency, dysuria, hematuria, incontinence   Muskuloskeletal :  arthralgia, myalgia   Hematology:  easy bruising, nose or gum bleeding, lymphadenopathy   Dermatological: rash, ulceration, pruritis   Endocrine:   hot flashes or polydipsia   Neurological:  headache, dizziness, confusion, focal weakness, paresthesia,     Speech difficulties, memory loss, gait disturbance  Psychological: Feelings of anxiety, depression, agitation    Objective:   VITALS:    Visit Vitals    BP (!) 219/68    Pulse 60    Temp 98.1 °F (36.7 °C)    Resp 18    Ht 5' 3\" (1.6 m)    Wt 70.8 kg (156 lb)    SpO2 100%    BMI 27.63 kg/m2     Temp (24hrs), Av.1 °F (36.7 °C), Min:98.1 °F (36.7 °C), Max:98.1 °F (36.7 °C)      PHYSICAL EXAM:   General:    Alert, cooperative, no distress, appears stated age. HEENT: Atraumatic, anicteric sclerae, pink conjunctivae     No oral ulcers, mucosa moist, throat clear  Neck:  Supple, symmetrical,  thyroid: non tender  Lungs:   Clear to auscultation bilaterally. No Wheezing or Rhonchi. No rales. Chest wall:  No tenderness  No Accessory muscle use. Heart:   Regular  rhythm,  No  murmur   No edema  Abdomen:   Soft, non-tender. Not distended. Bowel sounds normal  Extremities: RLE and LUE in splint  Skin:     Not pale. Not Jaundiced  No rashes   Psych:  Good insight. Not depressed. Not anxious or agitated. Neurologic: EOMs intact. No facial asymmetry. No aphasia or slurred speech. Symmetrical strength, Alert and oriented X 4.     _______________________________________________________________________  Care Plan discussed with:    Comments   Patient x    Family  x    RN x    Care Manager                    Consultant:      ____________________________________________________________________  TOTAL TIME:     60 mins    Comments    x Reviewed previous records   >50% of visit spent in counseling and coordination of care  Discussion with patient and/or family and questions answered       Critical Care Provided     Minutes non procedure based  ________________________________________________________________________  Signed: Emily Dalal MD      Procedures: see electronic medical records for all procedures/Xrays and details which were not copied into this note but were reviewed prior to creation of Plan.     LAB DATA REVIEWED:    Recent Results (from the past 24 hour(s))   EKG, 12 LEAD, INITIAL    Collection Time: 17  3:40 PM   Result Value Ref Range    Ventricular Rate 63 BPM    Atrial Rate 63 BPM    P-R Interval 184 ms QRS Duration 88 ms    Q-T Interval 434 ms    QTC Calculation (Bezet) 444 ms    Calculated P Axis 12 degrees    Calculated R Axis -15 degrees    Calculated T Axis 44 degrees    Diagnosis       Sinus rhythm with marked sinus arrhythmia with occasional premature   ventricular complexes  Left ventricular hypertrophy with repolarization abnormality  When compared with ECG of 28-MAY-2016 15:36,  premature ventricular complexes are now present  premature atrial complexes are no longer present     CBC WITH AUTOMATED DIFF    Collection Time: 04/03/17  4:02 PM   Result Value Ref Range    WBC 12.4 (H) 3.6 - 11.0 K/uL    RBC 5.24 (H) 3.80 - 5.20 M/uL    HGB 12.9 11.5 - 16.0 g/dL    HCT 41.5 35.0 - 47.0 %    MCV 79.2 (L) 80.0 - 99.0 FL    MCH 24.6 (L) 26.0 - 34.0 PG    MCHC 31.1 30.0 - 36.5 g/dL    RDW 16.4 (H) 11.5 - 14.5 %    PLATELET 332 372 - 680 K/uL    NEUTROPHILS 74 32 - 75 %    LYMPHOCYTES 15 12 - 49 %    MONOCYTES 9 5 - 13 %    EOSINOPHILS 2 0 - 7 %    BASOPHILS 0 0 - 1 %    ABS. NEUTROPHILS 9.1 (H) 1.8 - 8.0 K/UL    ABS. LYMPHOCYTES 1.9 0.8 - 3.5 K/UL    ABS. MONOCYTES 1.1 (H) 0.0 - 1.0 K/UL    ABS. EOSINOPHILS 0.3 0.0 - 0.4 K/UL    ABS. BASOPHILS 0.1 0.0 - 0.1 K/UL   METABOLIC PANEL, COMPREHENSIVE    Collection Time: 04/03/17  4:02 PM   Result Value Ref Range    Sodium 141 136 - 145 mmol/L    Potassium 3.2 (L) 3.5 - 5.1 mmol/L    Chloride 103 97 - 108 mmol/L    CO2 30 21 - 32 mmol/L    Anion gap 8 5 - 15 mmol/L    Glucose 155 (H) 65 - 100 mg/dL    BUN 19 6 - 20 MG/DL    Creatinine 0.99 0.55 - 1.02 MG/DL    BUN/Creatinine ratio 19 12 - 20      GFR est AA >60 >60 ml/min/1.73m2    GFR est non-AA 53 (L) >60 ml/min/1.73m2    Calcium 8.7 8.5 - 10.1 MG/DL    Bilirubin, total 0.6 0.2 - 1.0 MG/DL    ALT (SGPT) 18 12 - 78 U/L    AST (SGOT) 23 15 - 37 U/L    Alk.  phosphatase 127 (H) 45 - 117 U/L    Protein, total 8.4 (H) 6.4 - 8.2 g/dL    Albumin 3.8 3.5 - 5.0 g/dL    Globulin 4.6 (H) 2.0 - 4.0 g/dL    A-G Ratio 0.8 (L) 1.1 - 2.2         _____________________________  Hospitalist: Cat Kim MD

## 2017-04-04 NOTE — PROGRESS NOTES
Hospitalist Progress Note    NAME: Brianda Rhodes   :  1927   MRN:  [id number]       Interim Hospital Summary: 80 y.o. female whom presented on 4/3/2017 with      Assessment / Plan:  Comminuted fracture of right femur   L distal radius fracture  -s/p R femur ORIF with intramedullary implant  S/p irrigation and debridement or R open distal radius/ulnar wound, close reduction and casting L wrist  -pain and dvt prophylaxis managed by ortho team  -gentle IVF  -PT/OT/CM     Accelerated HTN, resolved  -likely induced by pain   -continue BB and hydralazine prn      R eye glaucoma  -continue eye drop     Hypothyroidism  -continue pta synthroid     Hx of T2DM  -A1C 6.4  -continue SSI    Hypokalemia  -will replete  -will repeat bmp         Code status: DNR       Subjective:     Chief Complaint / Reason for Physician Visit  \"I feel ok\"  Pain well control on current pain meds. Discussed with RN events overnight. Review of Systems:  Symptom Y/N Comments  Symptom Y/N Comments   Fever/Chills n   Chest Pain n    Poor Appetite n   Edema n    Cough n   Abdominal Pain n    Sputum n   Joint Pain n    SOB/GUTIERRES n   Pruritis/Rash     Nausea/vomit    Tolerating PT/OT     Diarrhea    Tolerating Diet     Constipation    Other       Could NOT obtain due to:      Objective:     VITALS:   Last 24hrs VS reviewed since prior progress note.  Most recent are:  Patient Vitals for the past 24 hrs:   Temp Pulse Resp BP SpO2   17 0500 97.6 °F (36.4 °C) 69 18 152/57 99 %   17 0408 97.5 °F (36.4 °C) 65 18 169/71 96 %   17 0356 - - - - 96 %   17 0338 97.4 °F (36.3 °C) 71 18 182/67 98 %   17 0308 97.2 °F (36.2 °C) 71 18 164/66 100 %   17 0238 97.6 °F (36.4 °C) 69 18 175/86 100 %   17 0200 97.4 °F (36.3 °C) 64 19 167/45 100 %   17 0145 - 63 19 177/71 100 %   17 0130 - 67 16 165/74 100 %   04/ 11 - 100 %   17 17 156/50 100 %   17 14 161/58 100 % 04/04/17 0110 - 64 14 139/61 99 %   04/04/17 0106 97.4 °F (36.3 °C) 68 15 165/73 100 %   04/03/17 2000 - 66 13 (!) 205/80 93 %   04/03/17 1930 - 62 14 190/67 97 %   04/03/17 1914 - 60 18 (!) 219/68 100 %   04/03/17 1909 - (!) 59 22 (!) 219/84 98 %   04/03/17 1901 - 61 18 - 99 %   04/03/17 1900 - 62 16 190/62 100 %   04/03/17 1845 - 60 14 196/60 95 %   04/03/17 1831 - 62 13 - 95 %   04/03/17 1830 - - - 199/53 96 %   04/03/17 1522 98.1 °F (36.7 °C) 60 13 (!) 252/80 97 %       Intake/Output Summary (Last 24 hours) at 04/04/17 0845  Last data filed at 04/04/17 0238   Gross per 24 hour   Intake          1651.25 ml   Output              850 ml   Net           801.25 ml        PHYSICAL EXAM:  General: WD, WN. Alert, cooperative, no acute distress    EENT:  EOMI. Anicteric sclerae. MMM  Resp:  CTA bilaterally, no wheezing or rales. No accessory muscle use  CV:  Regular  rhythm,  No edema  GI:  Soft, Non distended, Non tender.  +Bowel sounds  Neurologic:  Alert and oriented X 3, normal speech,   Psych:   Good insight. Not anxious nor agitated  Skin:  No rashes. No jaundice    Reviewed most current lab test results and cultures  YES  Reviewed most current radiology test results   YES  Review and summation of old records today    NO  Reviewed patient's current orders and MAR    YES  PMH/SH reviewed - no change compared to H&P  ________________________________________________________________________  Care Plan discussed with:    Comments   Patient x    Family      RN x    Care Manager     Consultant                        Multidiciplinary team rounds were held today with , nursing, pharmacist and clinical coordinator. Patient's plan of care was discussed; medications were reviewed and discharge planning was addressed.      ________________________________________________________________________  Total NON critical care TIME:  35   Minutes    Total CRITICAL CARE TIME Spent:   Minutes non procedure based Comments   >50% of visit spent in counseling and coordination of care     ________________________________________________________________________  Larena Pallas, MD     Procedures: see electronic medical records for all procedures/Xrays and details which were not copied into this note but were reviewed prior to creation of Plan. LABS:  I reviewed today's most current labs and imaging studies.   Pertinent labs include:  Recent Labs      04/03/17   1602   WBC  12.4*   HGB  12.9   HCT  41.5   PLT  283     Recent Labs      04/03/17   1602   NA  141   K  3.2*   CL  103   CO2  30   GLU  155*   BUN  19   CREA  0.99   CA  8.7   ALB  3.8   TBILI  0.6   SGOT  23   ALT  18       Signed: Larena Pallas, MD

## 2017-04-04 NOTE — ANESTHESIA POSTPROCEDURE EVALUATION
Post-Anesthesia Evaluation and Assessment    Patient: Ashvin Argueta MRN: [id number]  SSN: [id number]    YOB: 1927  Age: 80 y.o. Sex: female       Cardiovascular Function/Vital Signs  Visit Vitals    /71 (BP 1 Location: Right arm, BP Patient Position: At rest;Head of bed elevated (Comment degrees))    Pulse 65    Temp 36.4 °C (97.5 °F)    Resp 18    Ht 5' 3\" (1.6 m)    Wt 70.8 kg (156 lb)    SpO2 96%    BMI 27.63 kg/m2       Patient is status post general anesthesia for Procedure(s): FEMUR OPEN REDUCTION INTERNAL FIXATION, Synthes Nail,irrigation and [id number], closed reduction and casting left wrist; debridement and wound closure right elbow. Nausea/Vomiting: None    Postoperative hydration reviewed and adequate. Pain:  Pain Scale 1: Numeric (0 - 10) (04/04/17 0338)  Pain Intensity 1: 0 (04/04/17 0338)   Managed    Neurological Status:   Neuro (WDL): Exceptions to WDL (04/04/17 0200)  Neuro  Neurologic State: Alert;Drowsy; Eyes open spontaneously; Eyes open to voice (04/04/17 0200)  Orientation Level: Oriented to person;Oriented to place;Oriented to situation (04/04/17 0200)  Cognition: Follows commands (04/04/17 0200)  Speech: Clear (04/04/17 0200)  LUE Motor Response: Weak (04/04/17 0200)  LLE Motor Response: Purposeful (04/04/17 0200)  RUE Motor Response: Purposeful (04/04/17 0200)  RLE Motor Response: Weak (04/04/17 0200)   At baseline    Mental Status and Level of Consciousness: Arousable    Pulmonary Status:   O2 Device: Nasal cannula (04/04/17 0408)   Adequate oxygenation and airway patent    Complications related to anesthesia: None    Post-anesthesia assessment completed.  No concerns    Signed By: Frida Alvarado MD     April 4, 2017

## 2017-04-04 NOTE — ROUTINE PROCESS
Bedside and Verbal shift change report given to [place] (oncoming nurse) by Gustabo Huffman (offgoing nurse). Report included the following information SBAR and Kardex.

## 2017-04-04 NOTE — PERIOP NOTES
OK per Dr Cameron Left to transfer to assigned room once xrays completed. He will place sign out note at a later time.

## 2017-04-04 NOTE — PROGRESS NOTES
Attempted to see pt for pm PT session. Currently being transported down to x-ray her hand. Will continue to follow. Thank you.   Marla Michel, PT, DPT

## 2017-04-04 NOTE — PROGRESS NOTES
Problem: Mobility Impaired (Adult and Pediatric)  Goal: *Acute Goals and Plan of Care (Insert Text)  Physical Therapy Goals  Initiated 4/4/2017  1. Patient will move from supine to sit and sit to supine in bed with moderate assistance within 7 day(s). 2. Patient will transfer from bed to chair and chair to bed with maximal assistance using the least restrictive device within 7 day(s). 3. Patient will perform sit to stand with moderate assistance within 7 day(s). 4. Patient will ambulate with maximal assistance for 5 feet with the least restrictive device within 7 day(s). PHYSICAL THERAPY EVALUATION  Patient: Isaiah Mooney (63 y.o. female)  Date: 4/4/2017  Primary Diagnosis: Fracture of Mid-shaft Femur  Femur fracture, right (HCC)  Open fracture of left distal radius  Procedure(s) (LRB):  FEMUR OPEN REDUCTION INTERNAL FIXATION, Synthes Nail,irrigation and [id number], closed reduction and casting left wrist; debridement and wound closure right elbow (Right) 1 Day Post-Op   Precautions:   Fall, WBAT (WBAT RLE, platform for LUE)      ASSESSMENT :  Based on the objective data described below, the patient presents with generalized weakness, decreased activity tolerance, increased pain, impaired balance and overall significantly decreased functional mobility. PTA pt was living at an assisted living facility using her rollator. She was received in supine on 2L of supplemental oxygen running 100%. She did well on RA during activity. She required max A x 2 for all mobility. Came to the EOB and attempted to stand 2x with RW (platform on the L). Unable to come to full stand, leaning on the bed. She was returned to sitting and helped with scooting to [facility] with max A x 2. She was returned to supine, unable to safety transfer to chair. Recommending SNF at discharge. Ice and compression applied, left on RA. Patient will benefit from skilled intervention to address the above impairments.   Patients rehabilitation potential is considered to be Good  Factors which may influence rehabilitation potential include:   [X]         None noted  [ ]         Mental ability/status  [ ]         Medical condition  [ ]         Home/family situation and support systems  [ ]         Safety awareness  [ ]         Pain tolerance/management  [ ]         Other:        PLAN :  Recommendations and Planned Interventions:  [X]           Bed Mobility Training             [ ]    Neuromuscular Re-Education  [X]           Transfer Training                   [ ]    Orthotic/Prosthetic Training  [X]           Gait Training                         [ ]    Modalities  [X]           Therapeutic Exercises           [ ]    Edema Management/Control  [X]           Therapeutic Activities            [X]    Patient and Family Training/Education  [ ]           Other (comment):     Frequency/Duration: Patient will be followed by physical therapy  twice daily to address goals. Discharge Recommendations: Tye Manzo  Further Equipment Recommendations for Discharge: TBD       SUBJECTIVE:   Patient stated i am amazed I stood.       OBJECTIVE DATA SUMMARY:   HISTORY:    Past Medical History:   Diagnosis Date    Actinic keratoses 3/19/2010    Cholelithiases 3/19/2010    Decreased hearing 3/19/2010    Diffuse cystic mastopathy      Elevated C-reactive protein (CRP) 5/02    Glaucoma 3/19/2010    History of kidney stones      HTN (hypertension) 3/19/2010    Macular puckering of retina, right eye      Stress incontinence 3/19/2010    Unspecified hypothyroidism      Vitamin D deficiency 10/6/2010     Past Surgical History:   Procedure Laterality Date    ENDOSCOPY, COLON, DIAGNOSTIC   5/1/06    HX APPENDECTOMY        HX CATARACT REMOVAL        HX CYST INCISION AND DRAINAGE         left breast x 2    HX DINESH AND BSO         over several procedures    HX TONSILLECTOMY        HX TRABECULECTOMY   1990     laser OD     Prior Level of Function/Home Situation: pt as at an assisted living facility and using rollator  Personal factors and/or comorbidities impacting plan of care:      Home Situation  Home Environment: Assisted living  One/Two Story Residence: Other (Comment)  Living Alone: No  Support Systems: Assisted living  Patient Expects to be Discharged to[de-identified] Rehabilitation facility  Current DME Used/Available at Home: ritchie Suarez, rollator     EXAMINATION/PRESENTATION/DECISION MAKING:   Critical Behavior:  Neurologic State: Alert, Appropriate for age  Orientation Level: Oriented X4  Cognition: Appropriate decision making, Appropriate for age attention/concentration, Appropriate safety awareness, Follows commands     Hearing:   Auditory  Auditory Impairment: Hard of hearing, bilateral  Hearing Aids/Status: Bilateral  Skin:  Ace wrap and casts in place  Edema: L hand  Range Of Motion:  AROM: Generally decreased, functional           PROM: Generally decreased, functional           Strength:    Strength: Generally decreased, functional                    Tone & Sensation:   Tone: Normal              Sensation: Intact               Coordination:  Coordination: Generally decreased, functional  Vision:      Functional Mobility:  Bed Mobility:  Rolling: Maximum assistance  Supine to Sit: Moderate assistance;Maximum assistance;Assist x2        Transfers:  Sit to Stand: Maximum assistance;Assist x2  Stand to Sit: Maximum assistance;Assist x2                       Balance:   Sitting: Impaired  Sitting - Static: Good (unsupported)  Sitting - Dynamic: Fair (occasional)  Standing: Impaired  Standing - Static: Poor  Ambulation/Gait Training:   unable                          Functional Measure:  Barthel Index:      Bathin  Bladder: 0  Bowels: 0  Groomin  Dressin  Feedin  Mobility: 0  Stairs: 0  Toilet Use: 0  Transfer (Bed to Chair and Back): 0  Total: 5         Barthel and G-code impairment scale:  Percentage of impairment CH  0% CI  1-19% CJ  20-39% CK  40-59% CL  60-79% CM  80-99% CN  100%   Barthel Score 0-100 100 99-80 79-60 59-40 20-39 1-19    0   Barthel Score 0-20 20 17-19 13-16 9-12 5-8 1-4 0      The Barthel ADL Index: Guidelines  1. The index should be used as a record of what a patient does, not as a record of what a patient could do. 2. The main aim is to establish degree of independence from any help, physical or verbal, however minor and for whatever reason. 3. The need for supervision renders the patient not independent. 4. A patient's performance should be established using the best available evidence. Asking the patient, friends/relatives and nurses are the usual sources, but direct observation and common sense are also important. However direct testing is not needed. 5. Usually the patient's performance over the preceding 24-48 hours is important, but occasionally longer periods will be relevant. 6. Middle categories imply that the patient supplies over 50 per cent of the effort. 7. Use of aids to be independent is allowed. Alyssa Carson., Barthel, D.W. ([id number]). Functional evaluation: the Barthel Index. [address] [facility] (14)2. Arlin Beckham andrew Fuentes, [facility], Maylin Mehta., Rikki Marroquin., [employer], [address] [place] [address] (1999). Measuring the change indisability after inpatient rehabilitation; comparison of the responsiveness of the Barthel Index and Functional [place] Measure. Journal of Neurology, Neurosurgery, and Psychiatry, 66(4), 384-369. Jamir Rosa NTERE.ANAND, REID Estrella, & Sayra Johnson MLinoA. (2004.) Assessment of post-stroke quality of life in cost-effectiveness studies: The usefulness of the Barthel Index and the EuroQoL-5D. Quality of Life Research, 13, 465-08               G codes: In compliance with CMSs Claims Based Outcome Reporting, the following G-code set was chosen for this patient based on their primary functional limitation being treated:      The outcome measure chosen to determine the severity of the functional limitation was the barthel with a score of 5/100 which was correlated with the impairment scale. · Mobility - Walking and Moving Around:               - CURRENT STATUS:    CM - 80%-99% impaired, limited or restricted               - GOAL STATUS:           CL - 60%-79% impaired, limited or restricted               - D/C STATUS:                       ---------------To be determined---------------      Physical Therapy Evaluation Charge Determination   History Examination Presentation Decision-Making   MEDIUM  Complexity : 1-2 comorbidities / personal factors will impact the outcome/ POC  HIGH Complexity : 4+ Standardized tests and measures addressing body structure, function, activity limitation and / or participation in recreation  MEDIUM Complexity : Evolving with changing characteristics  HIGH Complexity : FOTO score of 1- 25       Based on the above components, the patient evaluation is determined to be of the following complexity level: MEDIUM     Pain:  Pain Scale 1: Numeric (0 - 10)  Pain Intensity 1: 5  Pain Location 1: Hand;Leg  Pain Orientation 1: Left;Right  Pain Description 1: Aching  Pain Intervention(s) 1: Medication (see MAR)  Activity Tolerance:   WFL  Please refer to the flowsheet for vital signs taken during this treatment. After treatment:   [ ]         Patient left in no apparent distress sitting up in chair  [X]         Patient left in no apparent distress in bed  [X]         Call bell left within reach  [X]         Nursing notified  [ ]         Caregiver present  [ ]         Bed alarm activated      COMMUNICATION/EDUCATION:   The patients plan of care was discussed with: Registered Nurse and NP.  [X]         Fall prevention education was provided and the patient/caregiver indicated understanding. [ ]         Patient/family have participated as able in goal setting and plan of care. [X]         Patient/family agree to work toward stated goals and plan of care.   [ ]         Patient understands intent and goals of therapy, but is neutral about his/her participation. [ ]         Patient is unable to participate in goal setting and plan of care.      Thank you for this referral.  Jo Ann Henry, PT, DPT   Time Calculation: 28 mins

## 2017-04-04 NOTE — H&P
CC: right leg pain, left wrist pain    HPI: 80 y.o. female with history of ground level fall with pain in the right thigh and left wrist/hand since this afternoon. She was reportedly with her sister at the time. She had just given her sister a large bag of candy while she adjusted her sister's walker. She went to step up on a curb and somehow hit her foot, falling onto her right leg and left arm. She had pain, swelling and deformity after. She was unable to bear weight. She was brought to the [facility] for further treatment. She denied hitting her head. No LOC. No numbness or tingling in any extremity. She has no pain in her left leg, right arm. Pain is located at the wrist, her middle finger on the left, and at the right thigh. She walks with a cane at baseline. She drives an automobile. ROS:  Positive per HPI, otherwise negative. PMHX:  Past Medical History:   Diagnosis Date    Actinic keratoses 3/19/2010    Cholelithiases 3/19/2010    Decreased hearing 3/19/2010    Diffuse cystic mastopathy     Elevated C-reactive protein (CRP) 5/02    Glaucoma 3/19/2010    History of kidney stones     HTN (hypertension) 3/19/2010    Macular puckering of retina, right eye     Stress incontinence 3/19/2010    Unspecified hypothyroidism     Vitamin D deficiency 10/6/2010       PSHX  Past Surgical History:   Procedure Laterality Date    ENDOSCOPY, COLON, DIAGNOSTIC  5/1/06    HX APPENDECTOMY      HX CATARACT REMOVAL      HX CYST INCISION AND DRAINAGE      left breast x 2    HX DINESH AND BSO      over several procedures    HX TONSILLECTOMY      HX TRABECULECTOMY  1990    laser OD   Right hip Cephalomedullary nail - Dr. Kim Jun 2016    ALLERGIES:  Allergies   Allergen Reactions    Codeine Itching    Ibuprofen Other (comments)     kidney failure    Pcn [Penicillins] Hives    Sulfur Not Reported This Time       MEDS  No current facility-administered medications on file prior to encounter.       No current outpatient prescriptions on file prior to encounter. SOC HX  Social History     Social History    Marital status:      Spouse name: Angel Bailey,      Number of children: N/A    Years of education: N/A     Occupational History    Not on file. Social History Main Topics    Smoking status: Former Smoker     Packs/day: 1.50     Years: 20.00     Quit date: 1982    Smokeless tobacco: Never Used    Alcohol use No    Drug use: No    Sexual activity: Not Currently     Other Topics Concern    Not on file     Social History Narrative       PE:  Visit Vitals    BP (!) 205/80    Pulse 66    Temp 98.1 °F (36.7 °C)    Resp 13    Ht 5' 3\" (1.6 m)    Wt 70.8 kg (156 lb)    SpO2 93%    BMI 27.63 kg/m2       RLE:  Splinted in EC by PA  Toes pink, cr brisk  +EHL/FHL  SILT dorsal/plantar toes  Splint dry    LUE:  Forearm in sugar tong splint  Fingers pink with brisk CR  SILT M/U/R distribution  +EPL/FPL/EDC/FDP/FDS  Moderate swelling and ecchymosis 3rd finger    Per Orthopaedic PA, there is a small poke hole on the volar surface of the distal forearm. No gross contamination    RUE:  Painless range of motion right elbow  Superficial skin tear over olecranon  No exposed bone  No active bleeding  Sensation: Intact Ax/M/U/R  Motor:   -  Intact: Biceps/Triceps   -  Intact: hand: FF/FE/FPL/EPL/IO  +RP        IMAGIN views right femur - displaced and comminuted distal 1/3 femur fracture; no obvious fracture of the femoral neck. Moderate right hip degenerative arthritis present. 3 views Left wrist - comminuted distal radius and ulnar styloid fracture with complete displacement. 2 view left elbow - no fractures, dislocations. No significant joint effusion    A/P: 79 yo community ambulator with right femoral shaft fracture, left open distal radius/ulna fracture, superficial laceration right elbow.   - Prophylactic antibiotics for open distal radius fracture - ordered as soon as wound found on exam. Ancef given. Will plan on 24 hours of antibiotics post-closure. Tetanus is up to date  - Plan on ORIF of right femoral nail. Will plan on retrograde IMN  - Incision and debridement with gentle irrigation of open left wrist wound. Closed reduction of fracture under anesthesia with application of long arm cast. Will attempt closed treatment, but need for open reduction internal fixation if closed reduction fails was discussed. - irrigation of left elbow wound with possible loose primary closure and application of dressing.  - Imaging of left hand and right elbow intra-op. Will need formal images postoperatively. - Operative consent obtained from patient and daughter (Vera Anderson) - risks of right femur ORIF include, but not limited to bleeding, infection, nonunion, hardware failure, need for revision, nerve injury, malunion,. Risks for closed reduction of wrist with casting include but not limited to failure of nonoperative treatment requiring surgery, persistent pain, malunion, nonunion.   - admit after surgery  - further recommendations to follow surgical fixation.

## 2017-04-04 NOTE — PROGRESS NOTES
S: Patient doing well and comfortable. Pain is controlled. She was able to stand at bedside this morning. O:  Visit Vitals    /49 (BP 1 Location: Right arm, BP Patient Position: At rest;Post activity)    Pulse 66    Temp 98.8 °F (37.1 °C)    Resp 18    Ht 5' 3\" (1.6 m)    Wt 70.8 kg (156 lb)    SpO2 94%    BMI 27.63 kg/m2       right lower extremity:  Dressing C/D/I  Toes: pink with brisk cap refill  + EHL/FHL  SILT dorsum and plantar toes    Left upper Extremity:  Long arm cast c/d/i  Sensation: Intact Ax/M/U/R  Motor:   -  Intact: hand: FF/FE/FPL/EPL/IO   swelling and ecchymosis of 2nd and 3rd digit    A/P: Postop day 1 status post right femur IM nail for a femoral shaft fracture, incisional debridement of open distal radius/ ulna fracture and closed reduction with long-arm cast application, debridement of right elbow laceration with primary closure  - patient is doing very well at this point. She will continue 24 hours of IV antibiotics for the open wound associated with distal radius and ulnar fracture. I will attempt to treat the distal radius and ulna non operatively with a long-arm cast.  She will require follow-up imaging in approximately 7 days to look for settling or displacement. Current alignment will likely allow for adequate function if it heals in this position. She is right-hand dominant,  So this is her nondominant hand. The patient and her daughter wish to pursue nonsurgical treatment as well. - continue with physical therapy. She will be weight-bearing as tolerated on bilateral lower extremities with weight-bearing through her left forearm with a platform walker.  - disposition planning - will likely need skilled nursing facility or inpatient rehabilitation.   - anticoagulation with Lovenox and SCDs  - pain control  - Dedicated imaging of left 2nd and 3rd fingers given swelling and ecchymosis pending

## 2017-04-04 NOTE — BRIEF OP NOTE
BRIEF OPERATIVE NOTE    Date of Procedure: 4/3/2017   Preoperative Diagnosis: Fracture of Mid-shaft Femur, left radius fracture  Postoperative Diagnosis: Fracture of Mid-shaft Femur, left radius fracture    Procedure(s):  Right FEMUR OPEN REDUCTION INTERNAL FIXATION with intramedullary implant. Irrigation and debridement of right open distal radius/ulna fracture wound, closed reduction and casting left wrist  Sharp debridement of right elbow wound and primary wound closure right elbow laceration    Surgeon(s) and Role:     * Jamie Watson MD - Primary            Surgical Staff:  Circ-1: Dorina Enrique RN  Circ-Relief: Frida Ellis RN; Gabriella He RN  Scrub Tech-1: Mahesh Rudd  Scrub RN-Relief: Melida Moraes RN  Surg Asst-1: Corinna Abraham  Surg Asst-Relief: Josephine Landers  Event Time In   Incision Start 2201   Incision Close 0049     Anesthesia: General   Estimated Blood Loss: approximately 50 cc  Specimens: * No specimens in log *   Findings: see full note   Complications: none  Implants:   Implant Name Type Inv.  Item Serial No.  Lot No. LRB No. Used Action   11MM TI MO RETRO/ANTEGRADE FEMORAL-NAIL-EX/340MM   [id number] DEPUY ORTHO [id number] Right 1 Implanted   SCR BNE LCK T25 F/IM NL [id number] --  - [id number]  SCR BNE LCK T25 F/IM NL [id number] --  [id number] SY[employer]ES [place] 04.005.570 Right 1 Implanted   SCR BNE LCK T25 F/IM NL 5X40MM --  - [id number]  SCR BNE LCK T25 F/IM NL 5X40MM --  [id number] SYNTHES [place] 04.005.530 Right 1 Implanted   SCR BNE LCK T25 F/IM NL 5X36MM --  - [id number]   SCR BNE LCK T25 F/IM NL [id number] --  [phone] SY[employer] [place]   Right 1 Implanted

## 2017-04-04 NOTE — WOUND CARE
Pressure Ulcer Prevention In basket Alert Received for Thomas < 14 (moderate risk).      Suggested Care Plan/Interventions for Nursing  1. Complete Thomas Pressure Ulcer Risk Scale and use sub scores to identify appropriate interventions. 2. Perform Assessment: skin, changes in LOC, visual cues for pain, monitor skin under medical devices  3. Respond to Reduced Sensory Perception: changes in LOC, check visual cues for pain, float heels, suspension boots, pressure redistribution bed/mattress/chair cushion, turning and reposition approximately every 2 hours (pillows & wedges), pad between skin to skin, turn & reposition  4. Manage Moisture: absorbent under pads, internal / external urinary device, internal /  external fecal device, minimize layers, contain wound drainage, access need for specialty bed, limit adult briefs, maintain skin hydration (lotion/cream), moisture barrier, offer toileting every hour  5. Promote Activity: increase time out of bed, chair cushion, PT/OT evaluation, trapeze to reposition, pressure redistribution bed/mattress/chair  6. Address Reduced Mobility: float heels / suspension boot, HOB 30 degrees or less, pressure redistribution bed/mattress/cushion, PT / OT evaluation, turn and reposition approximately every 2 hours (pillows & wedges)  7. Promote Nutrition: document food / fluid / supplement intake, encourage/assist with meals as needed  8. Reduce Friction and Shear: transferring/repositioning devices (lift/draw sheet), lift team/ patient mobility team, feet elevated on foot rest, minimize layers, foam dressing / transparent film / skin sealants, protective barrier creams and emollients, transfer aides (board, Elías lift, ceiling lift, stand assist), HOB 30 degrees or less, trapeze to reposition.   Wound Care Team

## 2017-04-04 NOTE — PERIOP NOTES
Handoff Report from Operating Room to PACU    Report received from Julane Severs, CRNA and Donaldo Black RN regarding Kimberly Resendiz. Surgeon(s):  Medagnieszka. Oly Christine MD  And Procedure(s) (LRB):  FEMUR OPEN REDUCTION INTERNAL FIXATION, Synthes Nail,irrigation and [id number], closed reduction and casting left wrist; debridement and wound closure right elbow (Right)  confirmed   with allergies, drains and dressings discussed. Anesthesia type, drugs, patient history, complications, estimated blood loss, vital signs, intake and output, and last pain medication, lines, reversal medications and temperature were reviewed.

## 2017-04-04 NOTE — OP NOTES
DATE: 4/3/17    Preoperative Diagnosis:  1. Right distal femoral shaft fracture, closed, comminuted  2. Left Gustilo Tate Type 1 intraarticular distal radius and ulna fracture  3. Right elbow laceration      Postoperative Diagnosis:  1. Right distal femoral shaft fracture, closed, comminuted  2. Left Gustilo Tate Type 1 intraarticular distal radius and ulna fracture  3. Right elbow laceration, approximately 2 cm by 2 cm    Procedure:  1. Open treatment of femoral shaft fracture (ORIF) , with insertion of intramedullary implant (55219)  2. Closed treatment of Distal radius and ulna fracture, with manipulation, under general anesthesia (48233)  3. Incision and debridement of skin (dermis and epidermis) of right elbow wound, less than 20 sq cm (94468)  4. Primary closure right elbow laceration (46216)    Surgeon:   Mario Cid MD    Anesthesia:  General    EBL:  < 50 cc    Complications:  None    Specimen:  None    Implants:  Synthes 11 x 340 mm retrograde femoral nail     HPI/Indication:  80-year-old female status post ground level fall with multiple injuries to her extremities. As a result of her fall, she has suffered a right comminuted distal femoral shaft fracture, open left distal radius and ulna fracture and a right elbow laceration. Procedure: The patient arrived at the surgical center. Consent was obtained for the surgical procedure, as well as the Anesthetic. The correct surgical limbs were identified and marked by the patient and myself. The patient was then brought to the Operating suite, where a time-out was performed which identified the patient's identity, the surgical site, and the surgical procedure. The patient received Ancef as prophylaxis for her open wound and for perioperative antibiotic management. An SCD was used on the nonoperative leg for DVT prophylaxis. The patient was placed in the supine position.  The ipsilateral hip was bumped  And a stack of blankets was used to allow for knee flexion for entry of the retrograde intramedullary nail. Large C-arm was brought in and longitudinal traction was applied to the fracture site. Orthogonal x-rays demonstrated near anatomic alignment of the fracture and thus it was determined that reduction could be achieved easily in this position and the C-arm was removed in preparation for prepping and draping the limb. The extremity was then prepped and draped in standard, sterile fashion. Once completely draped, another time-out was performed identifying all pertinent information. Bony anatomic landmarks at the knee were marked out with a sterile marking pen. An approximately 1/2 cm longitudinal incision was placed over the patellar tendon. The patellar tendon was split longitudinally in its midline. The starting guidewire was placed in the intercondylar notch and optimal position was confirmed on both AP and lateral images. The guidewire was advanced. An opening Reamer was then used to open the distal femur. The patellar tendon was protected with a soft tissue protection sleeve during this process. With longitudinal traction, a ball-tip guidewire with slight bend at the end was advanced into the opened distal femoral insertion site. The guidewire was advanced under fluoroscopic guidance with maintenance of reduction. The guidewire passed across the fracture site and into the proximal femur. The tip of the guidewire was placed just proximal to the lesser trochanter. The measurement device for the intramedullary nail was then placed over the guide tip and measured per manufacture's instructions. It was determined that a 340 mm retrograde nail was indicated. The measurement device was removed. The femoral canal was then reamed sequentially starting at 10 mm. This was visualized with fluoroscopy as the Reamer across the fracture site. Reduction was well maintained.   The reamers were  Sequentially increased by  0.5 mm increments to a final ream of 12.5 mm which gave me the ability to place an 11 mm nail. The 11 mm nail was placed easily over the  Ball-tip guidewire. Again, the nail was visualized as it passed across the fracture site. Reduction was well maintained. The nail was seated so that it did not remained proud in the intercondylar notch. The  Ball-tip guidewire was then removed. Using the aiming arm, 2 lateral to medial interlocking screws were placed. Attention was then turned to the proximal interlocking holes. Using perfect [place] technique, the anterior to posterior static interlocking hole was first filled with an interlocking screw. A 2nd interlocking screw was then placed in the dynamic hole just proximal to the static hole. Orthogonal views on fluoroscopy confirmed placement of intramedullary nail and reduction of the distal femoral shaft fracture. The wounds were irrigated and closed in a layered fashion. A sterile dressing of xeroform, 4x4s, tegaderm was secured. Sterile cast padding was applied. Drapes were then removed. An ACE wrap was then applied from the toes to the proximal thigh. Attention was then turned to the left distal radius and ulna fracture. The left upper extremity was prepped with Betadine scrub and paint. The extremity was then draped in standard sterile fashion. The 0.5 mm horizontal wound on the volar and ulnar aspect of the wrist was inspected. No gross debris was found. The edges of the wounds were incised using a 15 blade scalpel. The vascularized soft tissue including the dermis and epidermis with underlying subcutaneous fat was removed  Sharply. Wound edges were healthy and bleeding at the end of debridement. 1 L of irrigation was then utilized and the wound. The wound was  Loosely approximated with 3 interrupted 4-0 nylon sutures. Xeroform dressing was then applied to the wound with 4x4s and sterile cast padding applied. Drapes were then removed.   With the use of fluoroscopy, a closed reduction maneuver was performed on the distal radius and ulna fracture. Satisfactory alignment was verified  By orthogonal x-rays. A long-arm cast which was well padded, molded and bivalved was placed with the patient's elbow at 90 degrees. Post casting radiographs revealed maintained alignment. Attention was then turned to the right elbow laceration which measured approximately 2 cm x 2 cm. The wound had small amounts of debris associated with it. The wound was prepped with Betadine. And the arm was draped with sterile towels. There was a devascularized flap of tissue consisting of dermis and epidermis. This devascularized flap was sharply debrided with a 15 blade scalpel. The wound was then irrigated copiously with normal saline. The wound was approximated loosely with 4 nylon sutures. A sterile nonstick dressing was applied and a sterile soft dressing with 4x4s and Kerlix wrap was then applied. Plan:  - Weightbearing: weight bearing as tolerated right lower extremity; weight bearing through forearm in cast on platform walker.  - Will plan on new x-rays of left wrist in approximately 1 week to evaluate reduction and determine if we will continue closed treatment or need to be converted to open reduction internal fixation. - 24 hours of IV antibiotics (ancef) for Grade 1 open laceration of left wrist fracture  - Keep dressings clean and dry  - Elevate right lower and left upper extremity just above level of heart when not undergoing therapy  - Pain control  - DVT ppx - lovenox for 2 weeks; will transition to aspirin thereafter  - Vitamin D and Calcium supplementation; check vitamin D level  - endocrine evaluation referral as outpatient  - Vitamin C 500 mg BID  - Dispo: transfer back to inpatient room.  Will undergo PT eval and likely need Skilled nursing facility

## 2017-04-04 NOTE — PROGRESS NOTES
Bedside and Verbal shift change report given to Yolanda Bailey Dr (oncoming nurse) by Lisha Razo (offgoing nurse). Report included the following information SBAR, Kardex, Intake/Output, MAR and Recent Results.

## 2017-04-04 NOTE — PERIOP NOTES
TRANSFER - OUT REPORT:    Verbal report given to Einar Blizzard, RN (name) on Brianda Rhodes  being transferred to [facility] (unit) for routine post - op       Report consisted of patients Situation, Background, Assessment and   Recommendations(SBAR). Information from the following report(s) SBAR, Kardex, OR Summary, Procedure Summary, Intake/Output and MAR was reviewed with the receiving nurse. Lines:   Peripheral IV 04/03/17 Right Antecubital (Active)   Site Assessment Clean, dry, & intact 4/4/2017  1:10 AM   Phlebitis Assessment 0 4/4/2017  1:10 AM   Infiltration Assessment 0 4/4/2017  1:10 AM   Dressing Status Clean, dry, & intact 4/4/2017  1:10 AM   Dressing Type Tape;Transparent 4/4/2017  1:10 AM   Hub Color/Line Status Pink; Infusing 4/4/2017  1:10 AM   Action Taken Dressing reinforced;Blood drawn 4/3/2017  3:58 PM        Opportunity for questions and clarification was provided.       Patient transported with:   O2 @ 2 liters  Registered Nurse

## 2017-04-04 NOTE — CONSULTS
PROCEDURE NOTE - FRACTURE REDUCTION: The patient was induced with propofol for procedrual sedation via the emergency department MD. After it was confirmed that appropriate sedation had been reached, a longitudinal traction in conjunction with re-creation of the injury maneuver was applied reducing the fracture of the Left distal radius, it was discovered at that time the fracture was open on the ventral side. Subsequently, this was confirmed with bedside fluro images, a sugar tong splint was applied and again reduction was confirmed with bedside imaging. A posterior long leg splint placed to RLE, NVI pre and post. The patient was aroused from anesthesia and tolerated the procedure well. Post-reduction plain films reviewed with confirmation of a satisfactory reduction of the fracture. The extremity was neurovascularly intact post reduction and splint placement.

## 2017-04-05 LAB
ANION GAP BLD CALC-SCNC: 9 MMOL/L (ref 5–15)
BUN SERPL-MCNC: 17 MG/DL (ref 6–20)
BUN/CREAT SERPL: 18 (ref 12–20)
CALCIUM SERPL-MCNC: 7.8 MG/DL (ref 8.5–10.1)
CHLORIDE SERPL-SCNC: 105 MMOL/L (ref 97–108)
CO2 SERPL-SCNC: 27 MMOL/L (ref 21–32)
CREAT SERPL-MCNC: 0.94 MG/DL (ref 0.55–1.02)
ERYTHROCYTE [DISTWIDTH] IN BLOOD BY AUTOMATED COUNT: 16.5 % (ref 11.5–14.5)
GLUCOSE BLD STRIP.AUTO-MCNC: 112 MG/DL (ref 65–100)
GLUCOSE SERPL-MCNC: 103 MG/DL (ref 65–100)
HCT VFR BLD AUTO: 28.9 % (ref 35–47)
HGB BLD-MCNC: 9.3 G/DL (ref 11.5–16)
MCH RBC QN AUTO: 25.8 PG (ref 26–34)
MCHC RBC AUTO-ENTMCNC: 32.2 G/DL (ref 30–36.5)
MCV RBC AUTO: 80.1 FL (ref 80–99)
PLATELET # BLD AUTO: 219 K/UL (ref 150–400)
POTASSIUM SERPL-SCNC: 3.5 MMOL/L (ref 3.5–5.1)
RBC # BLD AUTO: 3.61 M/UL (ref 3.8–5.2)
SERVICE CMNT-IMP: ABNORMAL
SODIUM SERPL-SCNC: 141 MMOL/L (ref 136–145)
WBC # BLD AUTO: 15.7 K/UL (ref 3.6–11)

## 2017-04-05 PROCEDURE — 74011250637 HC RX REV CODE- 250/637: Performed by: NURSE PRACTITIONER

## 2017-04-05 PROCEDURE — 65270000029 HC RM PRIVATE

## 2017-04-05 PROCEDURE — 36415 COLL VENOUS BLD VENIPUNCTURE: CPT | Performed by: INTERNAL MEDICINE

## 2017-04-05 PROCEDURE — 80048 BASIC METABOLIC PNL TOTAL CA: CPT | Performed by: INTERNAL MEDICINE

## 2017-04-05 PROCEDURE — 74011250637 HC RX REV CODE- 250/637: Performed by: ORTHOPAEDIC SURGERY

## 2017-04-05 PROCEDURE — 77010033678 HC OXYGEN DAILY

## 2017-04-05 PROCEDURE — 74011250637 HC RX REV CODE- 250/637: Performed by: INTERNAL MEDICINE

## 2017-04-05 PROCEDURE — 97530 THERAPEUTIC ACTIVITIES: CPT

## 2017-04-05 PROCEDURE — 82962 GLUCOSE BLOOD TEST: CPT

## 2017-04-05 PROCEDURE — 74011250636 HC RX REV CODE- 250/636: Performed by: ORTHOPAEDIC SURGERY

## 2017-04-05 PROCEDURE — 85027 COMPLETE CBC AUTOMATED: CPT | Performed by: ORTHOPAEDIC SURGERY

## 2017-04-05 RX ORDER — ASCORBIC ACID 500 MG
500 TABLET ORAL 2 TIMES DAILY
Status: DISCONTINUED | OUTPATIENT
Start: 2017-04-05 | End: 2017-04-06 | Stop reason: HOSPADM

## 2017-04-05 RX ORDER — MELATONIN
4000 DAILY
Status: DISCONTINUED | OUTPATIENT
Start: 2017-04-06 | End: 2017-04-06 | Stop reason: HOSPADM

## 2017-04-05 RX ORDER — PRENATAL VIT CALC,IRON,FOLIC
1 TABLET ORAL 2 TIMES DAILY
Status: DISCONTINUED | OUTPATIENT
Start: 2017-04-05 | End: 2017-04-06 | Stop reason: HOSPADM

## 2017-04-05 RX ADMIN — LEVOTHYROXINE SODIUM 100 MCG: 100 TABLET ORAL at 07:11

## 2017-04-05 RX ADMIN — METOPROLOL SUCCINATE 25 MG: 25 TABLET, EXTENDED RELEASE ORAL at 09:04

## 2017-04-05 RX ADMIN — OXYCODONE HYDROCHLORIDE AND ACETAMINOPHEN 500 MG: 500 TABLET ORAL at 18:39

## 2017-04-05 RX ADMIN — Medication 10 ML: at 07:11

## 2017-04-05 RX ADMIN — ACETAMINOPHEN 650 MG: 325 TABLET, FILM COATED ORAL at 02:17

## 2017-04-05 RX ADMIN — OXYCODONE HYDROCHLORIDE AND ACETAMINOPHEN 500 MG: 500 TABLET ORAL at 13:31

## 2017-04-05 RX ADMIN — Medication 1 TABLET: at 13:31

## 2017-04-05 RX ADMIN — ACETAMINOPHEN 650 MG: 325 TABLET, FILM COATED ORAL at 09:04

## 2017-04-05 RX ADMIN — Medication 10 ML: at 22:52

## 2017-04-05 RX ADMIN — DOCUSATE SODIUM AND SENNOSIDES 2 TABLET: 8.6; 5 TABLET, FILM COATED ORAL at 09:05

## 2017-04-05 RX ADMIN — OXYCODONE HYDROCHLORIDE 5 MG: 5 TABLET ORAL at 07:11

## 2017-04-05 RX ADMIN — BRIMONIDINE TARTRATE 1 DROP: 2 SOLUTION/ DROPS OPHTHALMIC at 09:05

## 2017-04-05 RX ADMIN — Medication 1 TABLET: at 18:38

## 2017-04-05 RX ADMIN — DOCUSATE SODIUM AND SENNOSIDES 2 TABLET: 8.6; 5 TABLET, FILM COATED ORAL at 18:39

## 2017-04-05 RX ADMIN — OXYCODONE HYDROCHLORIDE 5 MG: 5 TABLET ORAL at 13:34

## 2017-04-05 RX ADMIN — Medication 10 ML: at 16:25

## 2017-04-05 RX ADMIN — ACETAMINOPHEN 650 MG: 325 TABLET, FILM COATED ORAL at 16:25

## 2017-04-05 RX ADMIN — OXYCODONE HYDROCHLORIDE 5 MG: 5 TABLET ORAL at 02:17

## 2017-04-05 RX ADMIN — BRIMONIDINE TARTRATE 1 DROP: 2 SOLUTION/ DROPS OPHTHALMIC at 22:52

## 2017-04-05 RX ADMIN — ENOXAPARIN SODIUM 40 MG: 40 INJECTION SUBCUTANEOUS at 09:56

## 2017-04-05 NOTE — PROGRESS NOTES
S: Patient doing well. Reports no pain. No cp or sob. Able to move left shoulder and fingers fully without pain. Was able to stand twice today with PT, but not able to take steps yet. O:  Visit Vitals    /76    Pulse 68    Temp 98 °F (36.7 °C)    Resp 16    Ht 5' 3\" (1.6 m)    Wt 70.8 kg (156 lb)    SpO2 90%    BMI 27.63 kg/m2       right lower extremity:  dressing C/D/I  Toes: pink with brisk cap refill  + EHL/FHL/gsc/TA  SILT dorsum and plantar toes    CRISTAL Extremity:  Cast c/d/i  Sensation: Intact Ax/M/U/R  Motor:   -  Intact: hand: FF/FE/FPL/EPL/IO  CR brisk    R elbow: wound c/d/i with nylon suture; no drainage      A/P: POD 2 s/p ORIF R femoral shaft, closed reduction L open DR/ distal ulna fracture  - RLE - WBAT  - LUE - attempting closed treatment of distal radius/ulna fracture; alignment acceptable in nondominant hand; patient wants to pursue nonoperative treatment. Will get new x-rays in 7 - 10 days to verify maintained position.  - RUE - dry dressing changes.    - Check Vitamin D level - will begin supplementation of Vitamin D, Calcium  - Vitamin C 500 mg TID

## 2017-04-05 NOTE — PROGRESS NOTES
Bedside interdisciplinary rounds were held today to discuss patient plan of care and outcomes. The following members were present: Nurse Practitioners, Nurse, Clinical Care Leader, Pharmacy, Physical Therapy, and Case Management. Plan:  Follow labs. Discharge planning to rehab possibly tomorrow.

## 2017-04-05 NOTE — ROUTINE PROCESS
Bedside and Verbal shift change report given to eva RN (oncoming nurse) by Pj Yanez RN (offgoing nurse). Report included the following information SBAR, Kardex, OR Summary, Procedure Summary, Intake/Output, MAR, Accordion, Recent Results and Med Rec Status.

## 2017-04-05 NOTE — PROGRESS NOTES
Problem: Mobility Impaired (Adult and Pediatric)  Goal: *Acute Goals and Plan of Care (Insert Text)  Physical Therapy Goals  Initiated 4/4/2017  1. Patient will move from supine to sit and sit to supine in bed with moderate assistance within 7 day(s). 2. Patient will transfer from bed to chair and chair to bed with maximal assistance using the least restrictive device within 7 day(s). 3. Patient will perform sit to stand with moderate assistance within 7 day(s). 4. Patient will ambulate with maximal assistance for 5 feet with the least restrictive device within 7 day(s). PHYSICAL THERAPY TREATMENT  Patient: Carola Mejia (21 y.o. female)  Date: 4/5/2017  Diagnosis: Fracture of Mid-shaft Femur  Femur fracture, right (HCC)  Open fracture of left distal radius Open fracture of distal end of left radius  Procedure(s) (LRB):  FEMUR OPEN REDUCTION INTERNAL FIXATION, Synthes Nail,irrigation and [id number], closed reduction and casting left wrist; debridement and wound closure right elbow (Right) 2 Days Post-Op  Precautions: Fall, WBAT (WBAT RLE, platform for LUE)      ASSESSMENT:  Pt was received in supine and cleared by nursing to mobilize. Bed mobility required mod A x 2 to come to to EOB. Once EOB pt was able to brush her teeth with assistance with set up. Stood 2x with max A x 2, attempted with both the walker and without. She had difficulty with both. In standing attempted to have pt side step to [facility], she was unable to clear her feet. Not safe to transfer pt to the bed at this time. She was returned to supine and positioned for comfort. Kahlil to recommend SNF at discharge. Progression toward goals:  [ ]    Improving appropriately and progressing toward goals  [X]    Improving slowly and progressing toward goals  [ ]    Not making progress toward goals and plan of care will be adjusted       PLAN:  Patient continues to benefit from skilled intervention to address the above impairments.   Continue treatment per established plan of care. Discharge Recommendations:  Skilled Nursing Facility  Further Equipment Recommendations for Discharge:  TBD       SUBJECTIVE:   Patient stated I don't know how yall do this everyday.       OBJECTIVE DATA SUMMARY:   Critical Behavior:  Neurologic State: Alert, Appropriate for age  Orientation Level: Oriented X4  Cognition: Appropriate decision making     Functional Mobility Training:  Bed Mobility:     Supine to Sit: Moderate assistance;Assist x2              Transfers:  Sit to Stand: Maximum assistance;Assist x2  Stand to Sit: Maximum assistance;Assist x2                             Balance:  Sitting: Intact  Standing: Impaired  Standing - Static: Poor;Constant support  Standing - Dynamic : Poor  Ambulation/Gait Training:   attempted to side step, pt unable to clear her feet                             Activity Tolerance:   WFL  Please refer to the flowsheet for vital signs taken during this treatment.   After treatment:   [ ]    Patient left in no apparent distress sitting up in chair  [X]    Patient left in no apparent distress in bed  [X]    Call bell left within reach  [X]    Nursing notified  [ ]    Caregiver present  [ ]    Bed alarm activated      COMMUNICATION/COLLABORATION:   The patients plan of care was discussed with: Registered Nurse     Ryne Davison PT, DPT   Time Calculation: 25 mins

## 2017-04-05 NOTE — PROGRESS NOTES
Hospitalist Progress Note    NAME: Wanda Louis   :  1927   MRN:  [id number]       Interim Hospital Summary: 80 y.o. female whom presented on 4/3/2017 with      Assessment / Plan:  Comminuted fracture of right femur   L distal radius fracture  -s/p R femur ORIF with intramedullary implant  S/p irrigation and debridement or R open distal radius/ulnar wound, close reduction and casting L wrist  -pain and dvt prophylaxis managed by ortho team  -gentle IVF  -PT/OT/CM   -DC planning per primary team    Accelerated HTN, resolved  -likely driven by pain   -continue BB and hydralazine prn      R eye glaucoma  -continue eye drop     Hypothyroidism  -continue pta synthroid     Hx of T2DM  -A1C 6.4  -continue SSI    Hypokalemia  -will replete  -will repeat bmp         Code status: DNR       Subjective:     Chief Complaint / Reason for Physician Visit  No acute complaints. Discussed with RN events overnight. Review of Systems:  Symptom Y/N Comments  Symptom Y/N Comments   Fever/Chills n   Chest Pain n    Poor Appetite n   Edema n    Cough n   Abdominal Pain n    Sputum n   Joint Pain n    SOB/GUTIERRES n   Pruritis/Rash     Nausea/vomit    Tolerating PT/OT     Diarrhea    Tolerating Diet     Constipation    Other       Could NOT obtain due to:      Objective:     VITALS:   Last 24hrs VS reviewed since prior progress note.  Most recent are:  Patient Vitals for the past 24 hrs:   Temp Pulse Resp BP SpO2   17 0904 - 65 - 179/76 -   17 0814 98 °F (36.7 °C) 65 16 179/76 92 %   17 0331 97.2 °F (36.2 °C) 61 16 132/63 95 %   17 2325 98 °F (36.7 °C) 60 18 116/44 97 %   17 1934 °F (37 °C) 72 16 142/56 97 %   17 1632 - - - - 94 %   17 1549 98.8 °F (37.1 °C) 66 18 160/49 92 %   17 1222 - (!) 55 - 122/49 -   17 1130 97.7 °F (36.5 °C) (!) 55 18 122/49 93 %       Intake/Output Summary (Last 24 hours) at 17 1105  Last data filed at 17 0943   Gross per 24 hour Intake             2100 ml   Output             1900 ml   Net              200 ml        PHYSICAL EXAM:  General: WD, WN. Alert, cooperative, no acute distress    EENT:  EOMI. Anicteric sclerae. MMM  Resp:  CTA bilaterally, no wheezing or rales. No accessory muscle use  CV:  Regular  rhythm,  No edema  GI:  Soft, Non distended, Non tender.  +Bowel sounds  Neurologic:  Alert and oriented X 3, normal speech,   Psych:   Good insight. Not anxious nor agitated  Skin:  No rashes. No jaundice    Reviewed most current lab test results and cultures  YES  Reviewed most current radiology test results   YES  Review and summation of old records today    NO  Reviewed patient's current orders and MAR    YES  PMH/SH reviewed - no change compared to H&P  ________________________________________________________________________  Care Plan discussed with:    Comments   Patient x    Family      RN x    Care Manager     Consultant                        Multidiciplinary team rounds were held today with , nursing, pharmacist and clinical coordinator. Patient's plan of care was discussed; medications were reviewed and discharge planning was addressed. ________________________________________________________________________  Total NON critical care TIME:  35   Minutes    Total CRITICAL CARE TIME Spent:   Minutes non procedure based      Comments   >50% of visit spent in counseling and coordination of care     ________________________________________________________________________  Eddie Foster MD     Procedures: see electronic medical records for all procedures/Xrays and details which were not copied into this note but were reviewed prior to creation of Plan. LABS:  I reviewed today's most current labs and imaging studies.   Pertinent labs include:  Recent Labs      04/04/17   0907  04/03/17   1602   WBC  15.2*  12.4*   HGB  10.7*  12.9   HCT  33.0*  41.5   PLT  209  283     Recent Labs      04/05/17   0224 04/04/17   0907  04/03/17   1602   NA  141  144  141   K  3.5  3.2*  3.2*   CL  105  103  103   CO2  27  29  30   GLU  103*  118*  155*   BUN  17  16  19   CREA  0.94  0.80  0.99   CA  7.8*  8.2*  8.7   ALB   --    --   3.8   TBILI   --    --   0.6   SGOT   --    --   23   ALT   --    --   18       Signed: Diya Aguillon MD

## 2017-04-05 NOTE — PROGRESS NOTES
Pt is an 79 y/o  female admitted with open fracture of left distal radius and femur open reduction internal fixation. Info for this evaluation was provided by pt daughter Nathaly Duarte. Daughter reported pt has been a resident of [facility] since November 2016. Pt was in assisted living but was independent. She stated pt used a cane for ambulation and was still driving. Daughter stated pt was assisting her sister out of the car when she fell. Daughter stated pt has been to [facility] in the past but would like for the pt to go to [facility] at discharge. SW informed daughter a referral would be sent to [facility] for their review. Referral sent via CC link for [facility] to review. SW will continue to follow and assist with additional discharge needs. Care Management Interventions  PCP Verified by CM: Yes  Mode of Transport at Discharge:  Other (see comment) (Pt may need transport at discharge, TBD closer to transport)  Transition of Care Consult (CM Consult): SNF (Pt daughter chose [facility])  Partner SNF: Yes  Physical Therapy Consult: Yes  Occupational Therapy Consult: Yes  Current Support Network: Assisted Living (Pt has been a resident of [facility] since Nov 2016)  Confirm Follow Up Transport: Self (Pt was driving prior to admission but has supportive family to assist if needed)  Plan discussed with Pt/Family/Caregiver: Yes  Freedom of Choice Offered: Yes  Discharge Location  Discharge Placement: Skilled nursing facility ([facility])    ALEXIA Barraza  Ext [phone]

## 2017-04-05 NOTE — PROGRESS NOTES
Ortho/ NeuroSurgery NP Note    POD# 2  s/p FEMUR OPEN REDUCTION INTERNAL FIXATION, Synthes Nail,irrigation and debridement, closed reduction and casting left wrist; debridement and wound closure right elbow     Pt resting in bed. No complaints. Denies pain presently  VSS Afebrile. HTN. On NC 1 LPM 98% - wean today  Gray in place. Will remove when mobilized. Hopefully today  Tolerating PO well    Labs  Lab Results   Component Value Date/Time    HGB 10.7 04/04/2017 09:07 AM      Lab Results   Component Value Date/Time    INR 1.0 05/28/2016 03:52 PM        Right femur bulky ace Dressing c.d.i, cryotherapy   Left UE forearm cast c/d/i  Sling in elbow, positioned arm on pillow to keep hand elevated  Calves soft and supple; No pain with passive stretch  Sensation and motor intact  SCDs for mechanical DVT proph while in bed     PLAN:  1) PT BID; WBAT in RLE, NWB through left wrist (WB thru elbow)  2) Lovenox for DVT Prophylaxis  3) Elevate wrist  4) Kake - hearing aids in place  5) Wean O2  6) Remove gray if mobile todayPlan d/c based on PT recommendations.     Micaela Basurto NP

## 2017-04-05 NOTE — PROGRESS NOTES
Bedside and Verbal shift change report given to Elvin Fox (oncoming nurse) by Concha Guerrero (offgoing nurse). Report included the following information SBAR, Kardex, Intake/Output, MAR and Recent Results.

## 2017-04-06 VITALS
RESPIRATION RATE: 17 BRPM | OXYGEN SATURATION: 99 % | BODY MASS INDEX: 27.64 KG/M2 | TEMPERATURE: 98.2 F | DIASTOLIC BLOOD PRESSURE: 48 MMHG | WEIGHT: 156 LBS | HEART RATE: 64 BPM | HEIGHT: 63 IN | SYSTOLIC BLOOD PRESSURE: 157 MMHG

## 2017-04-06 LAB — 25(OH)D3 SERPL-MCNC: 60.3 NG/ML (ref 30–100)

## 2017-04-06 PROCEDURE — 74011250637 HC RX REV CODE- 250/637: Performed by: NURSE PRACTITIONER

## 2017-04-06 PROCEDURE — 74011250637 HC RX REV CODE- 250/637: Performed by: INTERNAL MEDICINE

## 2017-04-06 PROCEDURE — 77030011943

## 2017-04-06 PROCEDURE — 51798 US URINE CAPACITY MEASURE: CPT

## 2017-04-06 PROCEDURE — 36415 COLL VENOUS BLD VENIPUNCTURE: CPT | Performed by: ORTHOPAEDIC SURGERY

## 2017-04-06 PROCEDURE — 74011250636 HC RX REV CODE- 250/636: Performed by: INTERNAL MEDICINE

## 2017-04-06 PROCEDURE — 82306 VITAMIN D 25 HYDROXY: CPT | Performed by: ORTHOPAEDIC SURGERY

## 2017-04-06 PROCEDURE — 97530 THERAPEUTIC ACTIVITIES: CPT

## 2017-04-06 PROCEDURE — 74011250636 HC RX REV CODE- 250/636: Performed by: ORTHOPAEDIC SURGERY

## 2017-04-06 PROCEDURE — 74011250637 HC RX REV CODE- 250/637: Performed by: ORTHOPAEDIC SURGERY

## 2017-04-06 PROCEDURE — 97110 THERAPEUTIC EXERCISES: CPT

## 2017-04-06 RX ORDER — OXYCODONE HYDROCHLORIDE 5 MG/1
5-10 TABLET ORAL
Qty: 30 TAB | Refills: 0 | Status: SHIPPED | OUTPATIENT
Start: 2017-04-06 | End: 2018-01-01

## 2017-04-06 RX ORDER — OXYCODONE HYDROCHLORIDE 5 MG/1
5 TABLET ORAL
Status: DISCONTINUED | OUTPATIENT
Start: 2017-04-06 | End: 2017-04-06 | Stop reason: HOSPADM

## 2017-04-06 RX ORDER — AMOXICILLIN 250 MG
1 CAPSULE ORAL 2 TIMES DAILY
Qty: 60 TAB | Refills: 0 | Status: SHIPPED | OUTPATIENT
Start: 2017-04-06 | End: 2017-04-06

## 2017-04-06 RX ORDER — ACETAMINOPHEN 325 MG/1
650 TABLET ORAL EVERY 6 HOURS
Qty: 112 TAB | Refills: 0 | Status: SHIPPED
Start: 2017-04-06 | End: 2017-04-20

## 2017-04-06 RX ORDER — LEVOTHYROXINE SODIUM 100 UG/1
100 TABLET ORAL
Status: DISCONTINUED | OUTPATIENT
Start: 2017-04-07 | End: 2017-04-06 | Stop reason: HOSPADM

## 2017-04-06 RX ORDER — TRAMADOL HYDROCHLORIDE 50 MG/1
50 TABLET ORAL
Qty: 30 TAB | Refills: 0 | Status: SHIPPED | OUTPATIENT
Start: 2017-04-06 | End: 2017-04-06

## 2017-04-06 RX ORDER — ASPIRIN 325 MG
325 TABLET ORAL DAILY
Qty: 28 TAB | Refills: 0 | Status: SHIPPED | OUTPATIENT
Start: 2017-04-17 | End: 2017-05-01

## 2017-04-06 RX ORDER — AMLODIPINE BESYLATE 5 MG/1
5 TABLET ORAL DAILY
Qty: 30 TAB | Refills: 0 | Status: SHIPPED
Start: 2017-04-06

## 2017-04-06 RX ORDER — ASPIRIN 325 MG
325 TABLET ORAL DAILY
Qty: 28 TAB | Refills: 0 | Status: SHIPPED | OUTPATIENT
Start: 2017-04-06 | End: 2017-04-06

## 2017-04-06 RX ORDER — AMOXICILLIN 250 MG
2 CAPSULE ORAL 2 TIMES DAILY
Status: SHIPPED | COMMUNITY
Start: 2017-04-06

## 2017-04-06 RX ORDER — ENOXAPARIN SODIUM 100 MG/ML
40 INJECTION SUBCUTANEOUS EVERY 24 HOURS
Qty: 4.4 ML | Refills: 0 | Status: SHIPPED
Start: 2017-04-06 | End: 2017-04-17

## 2017-04-06 RX ORDER — MELATONIN
4000 DAILY
Qty: 120 TAB | Refills: 0 | Status: SHIPPED
Start: 2017-04-06 | End: 2017-05-06

## 2017-04-06 RX ORDER — TRAMADOL HYDROCHLORIDE 50 MG/1
50 TABLET ORAL
Qty: 30 TAB | Refills: 0 | Status: SHIPPED | OUTPATIENT
Start: 2017-04-06 | End: 2018-01-01

## 2017-04-06 RX ORDER — PRENATAL VIT CALC,IRON,FOLIC
1 TABLET ORAL 2 TIMES DAILY
Qty: 60 TAB | Refills: 0 | Status: SHIPPED
Start: 2017-04-06 | End: 2017-05-06

## 2017-04-06 RX ORDER — POLYETHYLENE GLYCOL 3350 17 G/17G
17 POWDER, FOR SOLUTION ORAL DAILY
Qty: 255 G | Refills: 0 | Status: SHIPPED | OUTPATIENT
Start: 2017-04-06 | End: 2017-04-21

## 2017-04-06 RX ORDER — AMLODIPINE BESYLATE 5 MG/1
5 TABLET ORAL DAILY
Status: DISCONTINUED | OUTPATIENT
Start: 2017-04-06 | End: 2017-04-06 | Stop reason: HOSPADM

## 2017-04-06 RX ORDER — ASCORBIC ACID 500 MG
500 TABLET ORAL 2 TIMES DAILY
Qty: 60 TAB | Refills: 0 | Status: SHIPPED
Start: 2017-04-06 | End: 2017-05-06

## 2017-04-06 RX ORDER — OXYCODONE HYDROCHLORIDE 5 MG/1
5-10 TABLET ORAL
Qty: 30 TAB | Refills: 0 | Status: SHIPPED | OUTPATIENT
Start: 2017-04-06 | End: 2017-04-06

## 2017-04-06 RX ADMIN — OXYCODONE HYDROCHLORIDE AND ACETAMINOPHEN 500 MG: 500 TABLET ORAL at 08:43

## 2017-04-06 RX ADMIN — BRIMONIDINE TARTRATE 1 DROP: 2 SOLUTION/ DROPS OPHTHALMIC at 08:45

## 2017-04-06 RX ADMIN — ENOXAPARIN SODIUM 40 MG: 40 INJECTION SUBCUTANEOUS at 10:11

## 2017-04-06 RX ADMIN — LEVOTHYROXINE SODIUM 100 MCG: 100 TABLET ORAL at 08:44

## 2017-04-06 RX ADMIN — DOCUSATE SODIUM AND SENNOSIDES 2 TABLET: 8.6; 5 TABLET, FILM COATED ORAL at 08:44

## 2017-04-06 RX ADMIN — AMLODIPINE BESYLATE 5 MG: 5 TABLET ORAL at 08:45

## 2017-04-06 RX ADMIN — Medication 10 ML: at 04:06

## 2017-04-06 RX ADMIN — Medication 10 ML: at 06:00

## 2017-04-06 RX ADMIN — ACETAMINOPHEN 650 MG: 325 TABLET, FILM COATED ORAL at 08:44

## 2017-04-06 RX ADMIN — Medication 1 TABLET: at 08:43

## 2017-04-06 RX ADMIN — HYDRALAZINE HYDROCHLORIDE 10 MG: 20 INJECTION INTRAMUSCULAR; INTRAVENOUS at 04:05

## 2017-04-06 RX ADMIN — METOPROLOL SUCCINATE 25 MG: 25 TABLET, EXTENDED RELEASE ORAL at 08:43

## 2017-04-06 RX ADMIN — VITAMIN D, TAB 1000IU (100/BT) 4000 UNITS: 25 TAB at 08:43

## 2017-04-06 NOTE — PROGRESS NOTES
Problem: Mobility Impaired (Adult and Pediatric)  Goal: *Acute Goals and Plan of Care (Insert Text)  Physical Therapy Goals  Initiated 4/4/2017  1. Patient will move from supine to sit and sit to supine in bed with moderate assistance within 7 day(s). 2. Patient will transfer from bed to chair and chair to bed with maximal assistance using the least restrictive device within 7 day(s). 3. Patient will perform sit to stand with moderate assistance within 7 day(s). 4. Patient will ambulate with maximal assistance for 5 feet with the least restrictive device within 7 day(s). PHYSICAL THERAPY TREATMENT  Patient: Anirudh Noble (35 y.o. female)  Date: 4/6/2017  Diagnosis: Fracture of Mid-shaft Femur  Femur fracture, right (HCC)  Open fracture of left distal radius Open fracture of distal end of left radius  Procedure(s) (LRB):  FEMUR OPEN REDUCTION INTERNAL FIXATION, Synthes Nail,irrigation and [id number], closed reduction and casting left wrist; debridement and wound closure right elbow (Right) 3 Days Post-Op  Precautions: Fall, WBAT (WBAT RLE, platform for LUE)      ASSESSMENT:  Patient cleared by RN to participate in therapy. Patient received supine in bed and eager to participate in therapy. Patient denied pain at rest and with mobility. Patient tolerated session well and making good progress towards goals. Patient completed supine to sit edge of bed with min assist to manage R LE towards edge of bed, use of UEs to reach for bed rails as well as to push from bed surface to assume sitting edge of bed. Patient performed sit<>stand x 2 trials from bed surface with min-mod assist x 2. Provided verbal and tactile cues to glutes and pelvis to promote trunk extension during standing. Patient benefited from HHA x 2 during static standing. Patient able to tolerate static standing x 2 minutes and practiced weight shifting left and right in prep for gait training.  Patient completed bed to chair transfer with mod assist x 2. Patient able to pivot and slide on each LE to advance towards chair positioned to the L of the patient. Patient with increased edema in L fingers and encouraged to remain elevated and perform gentle finger flexion. Patient will continue to benefit from PT to progress mobility as tolerated and reach highest level of independence. Patient will benefit from SNF placement upon discharge. Progression toward goals:  [ ]    Improving appropriately and progressing toward goals  [X]    Improving slowly and progressing toward goals  [ ]    Not making progress toward goals and plan of care will be adjusted       PLAN:  Patient continues to benefit from skilled intervention to address the above impairments. Continue treatment per established plan of care. Discharge Recommendations:  Skilled Nursing Facility  Further Equipment Recommendations for Discharge:  TBD at SNF       SUBJECTIVE:   Patient stated I'm not having any pain so it's not as bad today.       OBJECTIVE DATA SUMMARY:   Critical Behavior:  Neurologic State: Alert  Orientation Level: Oriented X4  Cognition: Appropriate for age attention/concentration, Follows commands     Functional Mobility Training:  Bed Mobility:     Supine to Sit: Minimum assistance; Additional time              Transfers:  Sit to Stand: Minimum assistance; Moderate assistance;Assist x2 (x 2 trials)  Stand to Sit: Minimum assistance; Moderate assistance;Assist x2        Bed to Chair: Moderate assistance;Assist x2            Provided verbal and tactile cues for pelvic and trunk extension to promote upright posture        Balance:  Sitting: Intact  Sitting - Static: Good (unsupported)  Sitting - Dynamic: Good (unsupported)  Standing: Impaired  Standing - Static: Constant support  Standing - Dynamic : Poor  Ambulation/Gait Training:  Distance (ft): 2 Feet (ft)  Assistive Device: Gait belt (HHA x 2 )  Ambulation - Level of Assistance:  Moderate assistance;Assist x2        Gait Abnormalities: Decreased step clearance;Shuffling gait (pivoting on LLE towards chair on the L)  Right Side Weight Bearing: As tolerated     Base of Support: Widened     Speed/Ne: Shuffled  Step Length: Right shortened;Left shortened        Therapeutic Exercises:   LAQ x 10, ankle pumps x 10. L finger flexion/extension for edema control  Pain:    Patient denied pain throughout session        Activity Tolerance:   good  Please refer to the flowsheet for vital signs taken during this treatment.   After treatment:   [X]    Patient left in no apparent distress sitting up in chair  [ ]    Patient left in no apparent distress in bed  [X]    Call bell left within reach  [X]    Nursing notified  [ ]    Caregiver present  [ ]    Bed alarm activated      COMMUNICATION/COLLABORATION:   The patients plan of care was discussed with: Registered Nurse     Gigi Myers PT , DPT   Time Calculation: 25 mins

## 2017-04-06 NOTE — PROGRESS NOTES
Bedside interdisciplinary rounds were held today to discuss patient plan of care and outcomes. The following members were present: Nurse Practitioners, Nurse, Clinical Care Leader, Pharmacy, Physical Therapy, and Case Management. Plan:  Add Ensure. BP stable now. DC to SNF today or tomorrow.

## 2017-04-06 NOTE — PROGRESS NOTES
Bedside and Verbal shift change report given to Salina Johnson RN (oncoming nurse) by Larissa Dior RN (offgoing nurse). Report included the following information SBAR, Kardex, Procedure Summary, Intake/Output and MAR.

## 2017-04-06 NOTE — PROGRESS NOTES
Pt to discharge to [facility] via [facility] with a requested time of 2:30PM.  SW alerted [facility] of discharge and inquired if they could accept the pt today. [facility] admissions stated they could accept. Transportation arranged with referral via [employer]. Pt daughter informed of pt discharge. Floor nurse provided call report number. FOC and 2nd IM letter on pt bedside chart. Care Management Interventions  PCP Verified by CM: Yes  Mode of Transport at Discharge:  Other (see comment) (Pt may need transport at discharge, TBD closer to transport)  Transition of Care Consult (CM Consult): SNF (Pt daughter chose [facility])  Partner SNF: Yes  Physical Therapy Consult: Yes  Occupational Therapy Consult: Yes  Current Support Network: Assisted Living (Pt has been a resident of [facility] since Nov 2016)  Confirm Follow Up Transport: Self (Pt was driving prior to admission but has supportive family to assist if needed)  Plan discussed with Pt/Family/Caregiver: Yes  Freedom of Choice Offered: Yes  Discharge Location  Discharge Placement: Skilled nursing facility ([facility])    [facility] [place], ALEXIA  Ext [phone]

## 2017-04-06 NOTE — PROGRESS NOTES
Ortho/ NeuroSurgery NP Note    POD# 3 s/p FEMUR OPEN REDUCTION INTERNAL FIXATION, Synthes Nail,irrigation and debridement, closed reduction and casting left wrist; debridement and wound closure right elbow     Pt resting in bed. No complaints. Denies pain presently  VSS Afebrile. HTN. On NC 1 LPM 98% - wean today  + void; Tolerating PO well although needs help setting up meal due to cast on left arm    Labs  Lab Results   Component Value Date/Time    HGB 9.3 04/05/2017 01:00 PM      Lab Results   Component Value Date/Time    INR 1.0 05/28/2016 03:52 PM        Right femur bulky ace Dressing c.d.i, cryotherapy   Left UE forearm cast c/d/i; fingers swollen and bruised. Dr. Gauri Kenney reviewed Hand Xray with no acute process seen  Sling in elbow, positioned arm on pillow to keep hand elevated  Calves soft and supple; No pain with passive stretch  Sensation and motor intact  SCDs for mechanical DVT proph while in bed     PLAN:  1) PT BID; WBAT in RLE, NWB through left wrist (WB thru elbow)  2) Lovenox for DVT Prophylaxis  3) Elevate wrist  4) Koi - hearing aids in place  5) Wean O2  6) Needs SNF placement.  Will also need follow up with rheumatology for fragility fracture    Rhonda Hayes NP

## 2017-04-06 NOTE — PROGRESS NOTES
Hospitalist Progress Note    NAME: Wanda Louis   :  1927   MRN:  [id number]       Interim Hospital Summary: 80 y.o. female whom presented on 4/3/2017 with      Assessment / Plan:  Comminuted fracture of right femur   L distal radius fracture  -s/p R femur ORIF with intramedullary implant  S/p irrigation and debridement or R open distal radius/ulnar wound, close reduction and casting L wrist  -pain and dvt prophylaxis managed by ortho team  -gentle IVF  -PT/OT/CM   -DC planning per primary team    Accelerated HTN  -elevated BP overnight. Likely component of pain   -will add amlodipine, titrate dose as needed  -continue with pain control meds, BB, and hydralazine prn      R eye glaucoma  -continue eye drop     Hypothyroidism  -continue synthroid     Hx of T2DM  -A1C 6.4  -continue SSI    Hypokalemia, resolved         Code status: DNR       Subjective:     Chief Complaint / Reason for Physician Visit  No acute complaints. Pleasant female in bed, NAD. Still has pain with movement of L arm. Discussed with RN events overnight. Review of Systems:  Symptom Y/N Comments  Symptom Y/N Comments   Fever/Chills n   Chest Pain n    Poor Appetite n   Edema n    Cough n   Abdominal Pain n    Sputum n   Joint Pain y    SOB/GUTIERRES n   Pruritis/Rash     Nausea/vomit    Tolerating PT/OT     Diarrhea    Tolerating Diet     Constipation    Other       Could NOT obtain due to:      Objective:     VITALS:   Last 24hrs VS reviewed since prior progress note.  Most recent are:  Patient Vitals for the past 24 hrs:   Temp Pulse Resp BP SpO2   17 0839 98.5 °F (36.9 °C) 88 16 170/72 98 %   17 0359 98.1 °F (36.7 °C) 86 16 192/88 92 %   17 2302 98.6 °F (37 °C) 70 16 169/59 93 %   17 1958 98 °F (36.7 °C) 78 16 167/61 93 %   17 1528 97.6 °F (36.4 °C) 68 16 197/84 92 %   17 1526 - - - - 92 %   17 1133 98 °F (36.7 °C) 68 16 137/76 90 %       Intake/Output Summary (Last 24 hours) at 17 [place] 120 filed at 04/06/17 0557   Gross per 24 hour   Intake             1200 ml   Output             1685 ml   Net             -485 ml        PHYSICAL EXAM:  General: WD, WN. Alert, cooperative, no acute distress    EENT:  EOMI. Anicteric sclerae. MMM  Resp:  CTA bilaterally, no wheezing or rales. No accessory muscle use  CV:  Regular  rhythm,  No edema  GI:  Soft, Non distended, Non tender.  +Bowel sounds  Neurologic:  Alert and oriented X 3, normal speech,   Psych:   Good insight. Not anxious nor agitated  Skin:  No rashes. No jaundice    Reviewed most current lab test results and cultures  YES  Reviewed most current radiology test results   YES  Review and summation of old records today    NO  Reviewed patient's current orders and MAR    YES  PMH/SH reviewed - no change compared to H&P  ________________________________________________________________________  Care Plan discussed with:    Comments   Patient x    Family  x diana   RN x    Care Manager     Consultant                        Multidiciplinary team rounds were held today with , nursing, pharmacist and clinical coordinator. Patient's plan of care was discussed; medications were reviewed and discharge planning was addressed. ________________________________________________________________________  Total NON critical care TIME:  35   Minutes    Total CRITICAL CARE TIME Spent:   Minutes non procedure based      Comments   >50% of visit spent in counseling and coordination of care     ________________________________________________________________________  Diya Aguillon MD     Procedures: see electronic medical records for all procedures/Xrays and details which were not copied into this note but were reviewed prior to creation of Plan. LABS:  I reviewed today's most current labs and imaging studies.   Pertinent labs include:  Recent Labs      04/05/17   1300  04/04/17   0907  04/03/17   1602   WBC  15.7*  15.2*  12.4*   HGB  9.3* 10.7*  12.9   HCT  28.9*  33.0*  41.5   PLT  219  209  283     Recent Labs      04/05/17   0226  04/04/17   0907  04/03/17   1602   NA  141  144  141   K  3.5  3.2*  3.2*   CL  105  103  103   CO2  27  29  30   GLU  103*  118*  155*   BUN  17  16  19   CREA  0.94  0.80  0.99   CA  7.8*  8.2*  8.7   ALB   --    --   3.8   TBILI   --    --   0.6   SGOT   --    --   23   ALT   --    --   18       Signed: Eddie Foster MD

## 2017-04-06 NOTE — DISCHARGE INSTRUCTIONS
Ian Álvarez MD  Postoperative Instructions    Right/Left Lower Extremity:   ___Non Weight Bearing    ___Postop Shoe   ___Heel touch weightbearing               ___CAM Vaughn Hasting   _X__Weightbearing as tolerated   ___Splint/Cast    Dressing:   _X__Keep Dressing or Splint clean & dry   _X__Do Not remove dressing; Dressing will be changed at first postoperative visit. Notify                      office if there is concern for circulatory issues. ___Other:     Corymargaret Gu You may shower 48 hours after your surgery. Do Not allow dressing or splint to get wet! Diet:    Advance diet as tolerated. You may experience nausea due to anesthesia, pain or pain medications. You should avoid spicy or heavy meals initially. Pain Management:   Take medications as directed and use the lowest dose possible to control your pain.  Do Not drive while taking narcotic pain medications. Elevation:   Strict elevation at or just above the level of your heart for the first 14 days after surgery. Limit time that foot/hand is in dependent position for trips to bathroom and kitchen as much as possible. Early control of swelling will improve future swelling. Ice:    __X___ Marc Arias may ice your surgical extremity for no longer than 20 minutes at a time, 3-4 times per day. Do Not apply ice directly to the skin.  _____ Do Not apply ice to surgical extremity. Aspirin therapy:    Please DO NOT take any NSAIDs (Ibuprofen, Advil, Motrin, or Aleve)    Call our office at [phone] if the following occur:   Severe swelling, profuse bleeding or severe pain which does not improve with pain medication.  Fever greater than 101.0; fevers less than this are very common in the first few days after surgery and are unlikely to indicate infection. Follow up:  With Ortho in 2 weeks and with Dr. Jaren Dudley as scheduled on May 11th at 1:30    Additional Instructions: N/A

## 2017-04-06 NOTE — DISCHARGE SUMMARY
Ortho Discharge Summary    Patient ID:  Kimberly Resendiz  [id number]  female  80 y.o.  7/21/1927    Admit date: 4/3/2017    Discharge date: 4/6/2017    Admitting Physician: Stacy Florian. Oly Christine MD     Consulting Physician(s):   Treatment Team: Attending Provider: Stacy Florian. Oly Christine MD; Consulting Provider: Reshma Soto MD; Consulting Provider: Austen Napier NP; Consulting Provider: Stacy Florian. Oly Christine MD; Utilization Review: Adrianne Ledbetter RN; Care Manager: ALEXIA Martínez    Date of Surgery:   4/3/2017     Preoperative Diagnosis:  Fracture of distal 1/3 shaft Femur, left Gustillo 1open distal radius/ulna fracture    Postoperative Diagnosis:   Fracture of distal 1/3 shaft Femur, left Gustillo 1 open distal radius/ulna fracture    Procedure(s): FEMUR OPEN REDUCTION INTERNAL FIXATION, Synthes Nail,irrigation and debreidement, closed reduction and casting left wrist; debridement and wound closure right elbow     Anesthesia Type:   General     Surgeon: Stacy Florian. Oly Christine MD                            HPI:  Pt is a 80 y.o. female who has a history of Fracture of Mid-shaft Femur, left radius fracture  with pain and limitations of activities of daily living who presents at this time for a  right femur ORIF and left wrist closed reduction following a traumatic fracture. PMH:   Past Medical History:   Diagnosis Date    Actinic keratoses 3/19/2010    Cholelithiases 3/19/2010    Decreased hearing 3/19/2010    Diffuse cystic mastopathy     Elevated C-reactive protein (CRP) 5/02    Glaucoma 3/19/2010    History of kidney stones     HTN (hypertension) 3/19/2010    Macular puckering of retina, right eye     Stress incontinence 3/19/2010    Unspecified hypothyroidism     Vitamin D deficiency 10/6/2010       Medications upon admission :   Prior to Admission Medications   Prescriptions Last Dose Informant Patient Reported?  Taking?   acetaminophen (MAPAP) 500 mg cap   Yes Yes   Sig: Take 1-2 Tabs by mouth every six (6) hours as needed. bisacodyl (BISAC-EVAC) 10 mg suppository   Yes Yes   Sig: Insert 10 mg into rectum daily. brimonidine-timolol (COMBIGAN) 0.2-0.5 % drop ophthalmic solution   Yes Yes   Sig: Administer 1 Drop to right eye every twelve (12) hours. ergocalciferol (VITAMIN D2) 50,000 unit capsule   Yes Yes   Sig: Take 50,000 Units by mouth every Sunday. levothyroxine (SYNTHROID) 100 mcg tablet   Yes Yes   Sig: Take 100 mcg by mouth Daily (before breakfast). magnesium hydroxide (MILK OF MAGNESIA) 400 mg/5 mL suspension   Yes Yes   Sig: Take 30 mL by mouth daily as needed for Constipation. metoprolol succinate (TOPROL-XL) 25 mg XL tablet   Yes Yes   Sig: Take 25 mg by mouth daily. promethazine (PHENERGAN) 12.5 mg tablet   Yes Yes   Sig: Take  by mouth every six (6) hours as needed for Nausea. senna-docusate (SENEXON-S) 8.6-50 mg per tablet   Yes Yes   Sig: Take 1 Tab by mouth two (2) times a day. sodium phosphate (DISPOSABLE ENEMA) 19-7 gram/118 mL enema   Yes Yes   Sig: Insert 1 Enema into rectum now. Facility-Administered Medications: None        Allergies: Allergies   Allergen Reactions    Codeine Itching    Ibuprofen Other (comments)     kidney failure    Pcn [Penicillins] Hives    Sulfur Not Reported This Time        Hospital Course: The patient underwent surgery. Complications:  None; patient tolerated the procedure well. Was taken to the PACU in stable condition and then transferred to the ortho floor. Perioperative Antibiotics:  Ancef     Postoperative Pain Management:  Ultram for mild-moderate pain, Oxycodone for severe pain. DVT Prophylaxis: Lovenox    Postoperative transfusions:    Number of units banked PRBCs =   none     Post Op complications: none    Hemoglobin at discharge:    Lab Results   Component Value Date/Time    HGB 9.3 (L) 04/05/2017 01:00 PM    INR 1.0 05/28/2016 03:52 PM       Dressing/splints - clean, dry and intact.  No significant erythema or swelling. Neurovascular exam found to be within normal limits. Wound appears to be healing without any evidence of infection. Physical Therapy started on the day following surgery and participated in bed mobility, transfers and ambulation. Gait:  Gait  Base of Support: Widened  Speed/Ne: Shuffled  Step Length: Right shortened, Left shortened  Gait Abnormalities: Decreased step clearance, Shuffling gait (pivoting on LLE towards chair on the L)  Ambulation - Level of Assistance: Moderate assistance, Assist x2  Distance (ft): 2 Feet (ft)  Assistive Device: Gait belt (HHA x 2 )                   Discharged to: SNF. Condition on Discharge:   stable    Discharge instructions:  - Anticoagulate with Lovenox for two weeks, then ASA for two weeks. - Take pain medications as prescribed  - Resume pre hospital diet      - Discharge activity: activity as tolerated  - Ambulate with Walker;    - Weight bearing status WBAT  - Wound Care Keep wound clean and dry. See discharge instruction sheet.  - Sutures need to be removed 14 days after surgery            -DISCHARGE MEDICATION LIST     Current Discharge Medication List      START taking these medications    Details   amLODIPine (NORVASC) 5 mg tablet Take 1 Tab by mouth daily. Qty: 30 Tab, Refills: 0      acetaminophen (TYLENOL) 325 mg tablet Take 2 Tabs by mouth every six (6) hours for 14 days. Qty: 112 Tab, Refills: 0      ascorbic acid, vitamin C, (VITAMIN C) 500 mg tablet Take 1 Tab by mouth two (2) times a day for 30 days. Qty: 60 Tab, Refills: 0      Calcium Citrate-Vitamin D3 (CITROCAL) tablet Take 1 Tab by mouth two (2) times a day for 30 days. Qty: 60 Tab, Refills: 0      cholecalciferol (VITAMIN D3) 1,000 unit tablet Take 4 Tabs by mouth daily for 30 days. Qty: 120 Tab, Refills: 0      oxyCODONE IR (ROXICODONE) 5 mg immediate release tablet Take 1-2 Tabs by mouth every four (4) hours as needed.  Max Daily Amount: 60 mg.  Qty: 30 Tab, Refills: 0      traMADol (ULTRAM) 50 mg tablet Take 1 Tab by mouth every six (6) hours as needed. Max Daily Amount: 200 mg. Qty: 30 Tab, Refills: 0      polyethylene glycol (MIRALAX) 17 gram/dose powder Take 17 g by mouth daily for 15 days. Qty: 255 g, Refills: 0      aspirin (ASPIRIN) 325 mg tablet Take 1 Tab by mouth daily for 14 days. Starting on 4/17/17 after completing two weeks of Lovenox  Qty: 28 Tab, Refills: 0      enoxaparin (LOVENOX) 40 mg/0.4 mL 0.4 mL by SubCUTAneous route every twenty-four (24) hours for 11 days. Last dose 4/17/17  Indications: DEEP VEIN THROMBOSIS PREVENTION  Qty: 4.4 mL, Refills: 0         CONTINUE these medications which have CHANGED    Details   senna-docusate (SENEXON-S) 8.6-50 mg per tablet Take 1 Tab by mouth two (2) times a day. CONTINUE these medications which have NOT CHANGED    Details   brimonidine-timolol (COMBIGAN) 0.2-0.5 % drop ophthalmic solution Administer 1 Drop to right eye every twelve (12) hours. bisacodyl (BISAC-EVAC) 10 mg suppository Insert 10 mg into rectum daily. sodium phosphate (DISPOSABLE ENEMA) 19-7 gram/118 mL enema Insert 1 Enema into rectum now.      magnesium hydroxide (MILK OF MAGNESIA) 400 mg/5 mL suspension Take 30 mL by mouth daily as needed for Constipation. promethazine (PHENERGAN) 12.5 mg tablet Take  by mouth every six (6) hours as needed for Nausea. metoprolol succinate (TOPROL-XL) 25 mg XL tablet Take 25 mg by mouth daily. ergocalciferol (VITAMIN D2) 50,000 unit capsule Take 50,000 Units by mouth every Sunday. levothyroxine (SYNTHROID) 100 mcg tablet Take 100 mcg by mouth Daily (before breakfast).          STOP taking these medications       acetaminophen (MAPAP) 500 mg cap Comments:   Reason for Stopping:            per medical continuation form      -Follow up in office in 2 weeks      Signed:  Yesi Roger  MSN, APRN, FNP-C, [facility]  Orthopaedic Nurse Practitioner    4/6/2017  12:21 PM

## 2018-01-01 ENCOUNTER — APPOINTMENT (OUTPATIENT)
Dept: GENERAL RADIOLOGY | Age: 83
DRG: 871 | End: 2018-01-01
Attending: EMERGENCY MEDICINE
Payer: MEDICARE

## 2018-01-01 ENCOUNTER — APPOINTMENT (OUTPATIENT)
Dept: CT IMAGING | Age: 83
DRG: 871 | End: 2018-01-01
Attending: EMERGENCY MEDICINE
Payer: MEDICARE

## 2018-01-01 ENCOUNTER — HOSPITAL ENCOUNTER (INPATIENT)
Age: 83
LOS: 1 days | DRG: 871 | End: 2018-06-10
Attending: EMERGENCY MEDICINE | Admitting: INTERNAL MEDICINE
Payer: MEDICARE

## 2018-01-01 ENCOUNTER — APPOINTMENT (OUTPATIENT)
Dept: ULTRASOUND IMAGING | Age: 83
DRG: 871 | End: 2018-01-01
Attending: INTERNAL MEDICINE
Payer: MEDICARE

## 2018-01-01 VITALS
DIASTOLIC BLOOD PRESSURE: 43 MMHG | SYSTOLIC BLOOD PRESSURE: 105 MMHG | OXYGEN SATURATION: 98 % | RESPIRATION RATE: 25 BRPM | BODY MASS INDEX: 27.66 KG/M2 | WEIGHT: 156.09 LBS | HEIGHT: 63 IN | TEMPERATURE: 103.9 F | HEART RATE: 106 BPM

## 2018-01-01 DIAGNOSIS — A41.9 SEPSIS, DUE TO UNSPECIFIED ORGANISM: Primary | ICD-10-CM

## 2018-01-01 DIAGNOSIS — I46.9 CARDIAC ARREST (HCC): ICD-10-CM

## 2018-01-01 LAB
ALBUMIN SERPL-MCNC: 3.5 G/DL (ref 3.5–5)
ALBUMIN/GLOB SERPL: 0.9 {RATIO} (ref 1.1–2.2)
ALP SERPL-CCNC: 144 U/L (ref 45–117)
ALT SERPL-CCNC: 31 U/L (ref 12–78)
ANION GAP SERPL CALC-SCNC: 12 MMOL/L (ref 5–15)
APPEARANCE UR: CLEAR
ARTERIAL PATENCY WRIST A: YES
AST SERPL-CCNC: 45 U/L (ref 15–37)
BACTERIA URNS QL MICRO: NEGATIVE /HPF
BASE DEFICIT BLDA-SCNC: 18.2 MMOL/L
BASOPHILS # BLD: 0.1 K/UL (ref 0–0.1)
BASOPHILS NFR BLD: 1 % (ref 0–1)
BDY SITE: ABNORMAL
BILIRUB SERPL-MCNC: 1.4 MG/DL (ref 0.2–1)
BILIRUB UR QL: NEGATIVE
BUN SERPL-MCNC: 16 MG/DL (ref 6–20)
BUN/CREAT SERPL: 13 (ref 12–20)
CALCIUM SERPL-MCNC: 8.7 MG/DL (ref 8.5–10.1)
CHLORIDE SERPL-SCNC: 105 MMOL/L (ref 97–108)
CK SERPL-CCNC: 38 U/L (ref 26–192)
CO2 SERPL-SCNC: 25 MMOL/L (ref 21–32)
COLOR UR: ABNORMAL
CREAT SERPL-MCNC: 1.19 MG/DL (ref 0.55–1.02)
DIFFERENTIAL METHOD BLD: ABNORMAL
EOSINOPHIL # BLD: 0 K/UL (ref 0–0.4)
EOSINOPHIL NFR BLD: 0 % (ref 0–7)
EPITH CASTS URNS QL MICRO: ABNORMAL /LPF
ERYTHROCYTE [DISTWIDTH] IN BLOOD BY AUTOMATED COUNT: 15.3 % (ref 11.5–14.5)
GLOBULIN SER CALC-MCNC: 3.8 G/DL (ref 2–4)
GLUCOSE SERPL-MCNC: 142 MG/DL (ref 65–100)
GLUCOSE UR STRIP.AUTO-MCNC: NEGATIVE MG/DL
HCO3 BLDA-SCNC: 7 MMOL/L (ref 22–26)
HCT VFR BLD AUTO: 43.1 % (ref 35–47)
HGB BLD-MCNC: 14 G/DL (ref 11.5–16)
HGB UR QL STRIP: NEGATIVE
IMM GRANULOCYTES # BLD: 0 K/UL (ref 0–0.04)
IMM GRANULOCYTES NFR BLD AUTO: 0 % (ref 0–0.5)
KETONES UR QL STRIP.AUTO: NEGATIVE MG/DL
LACTATE SERPL-SCNC: 14.6 MMOL/L (ref 0.4–2)
LACTATE SERPL-SCNC: 4.2 MMOL/L (ref 0.4–2)
LEUKOCYTE ESTERASE UR QL STRIP.AUTO: NEGATIVE
LIPASE SERPL-CCNC: 119 U/L (ref 73–393)
LYMPHOCYTES # BLD: 1.2 K/UL (ref 0.8–3.5)
LYMPHOCYTES NFR BLD: 11 % (ref 12–49)
MAGNESIUM SERPL-MCNC: 1.6 MG/DL (ref 1.6–2.4)
MCH RBC QN AUTO: 27.2 PG (ref 26–34)
MCHC RBC AUTO-ENTMCNC: 32.5 G/DL (ref 30–36.5)
MCV RBC AUTO: 83.7 FL (ref 80–99)
MONOCYTES # BLD: 0.3 K/UL (ref 0–1)
MONOCYTES NFR BLD: 3 % (ref 5–13)
NEUTS BAND NFR BLD MANUAL: 7 %
NEUTS SEG # BLD: 9.4 K/UL (ref 1.8–8)
NEUTS SEG NFR BLD: 78 % (ref 32–75)
NITRITE UR QL STRIP.AUTO: NEGATIVE
NRBC # BLD: 0 K/UL (ref 0–0.01)
NRBC BLD-RTO: 0 PER 100 WBC
PCO2 BLDA: 18 MMHG (ref 35–45)
PH BLDA: 7.21 [PH] (ref 7.35–7.45)
PH UR STRIP: 6.5 [PH] (ref 5–8)
PLATELET # BLD AUTO: 222 K/UL (ref 150–400)
PMV BLD AUTO: 11.2 FL (ref 8.9–12.9)
PO2 BLDA: 105 MMHG (ref 80–100)
POTASSIUM SERPL-SCNC: 2.7 MMOL/L (ref 3.5–5.1)
PROT SERPL-MCNC: 7.3 G/DL (ref 6.4–8.2)
PROT UR STRIP-MCNC: 30 MG/DL
RBC # BLD AUTO: 5.15 M/UL (ref 3.8–5.2)
RBC #/AREA URNS HPF: ABNORMAL /HPF (ref 0–5)
RBC MORPH BLD: ABNORMAL
SAO2 % BLD: 97 % (ref 92–97)
SAO2% DEVICE SAO2% SENSOR NAME: ABNORMAL
SERVICE CMNT-IMP: ABNORMAL
SODIUM SERPL-SCNC: 142 MMOL/L (ref 136–145)
SP GR UR REFRACTOMETRY: 1.01 (ref 1–1.03)
SPECIMEN SITE: ABNORMAL
TROPONIN I SERPL-MCNC: <0.04 NG/ML
UA: UC IF INDICATED,UAUC: ABNORMAL
UROBILINOGEN UR QL STRIP.AUTO: 0.2 EU/DL (ref 0.2–1)
WBC # BLD AUTO: 11 K/UL (ref 3.6–11)
WBC URNS QL MICRO: ABNORMAL /HPF (ref 0–4)

## 2018-01-01 PROCEDURE — 93005 ELECTROCARDIOGRAM TRACING: CPT

## 2018-01-01 PROCEDURE — 83690 ASSAY OF LIPASE: CPT

## 2018-01-01 PROCEDURE — 74011250636 HC RX REV CODE- 250/636: Performed by: INTERNAL MEDICINE

## 2018-01-01 PROCEDURE — 96365 THER/PROPH/DIAG IV INF INIT: CPT

## 2018-01-01 PROCEDURE — 74011250636 HC RX REV CODE- 250/636: Performed by: EMERGENCY MEDICINE

## 2018-01-01 PROCEDURE — 82550 ASSAY OF CK (CPK): CPT

## 2018-01-01 PROCEDURE — 36415 COLL VENOUS BLD VENIPUNCTURE: CPT

## 2018-01-01 PROCEDURE — 74177 CT ABD & PELVIS W/CONTRAST: CPT

## 2018-01-01 PROCEDURE — 83605 ASSAY OF LACTIC ACID: CPT | Performed by: INTERNAL MEDICINE

## 2018-01-01 PROCEDURE — 36600 WITHDRAWAL OF ARTERIAL BLOOD: CPT | Performed by: INTERNAL MEDICINE

## 2018-01-01 PROCEDURE — 96366 THER/PROPH/DIAG IV INF ADDON: CPT

## 2018-01-01 PROCEDURE — 74011250637 HC RX REV CODE- 250/637: Performed by: EMERGENCY MEDICINE

## 2018-01-01 PROCEDURE — 85025 COMPLETE CBC W/AUTO DIFF WBC: CPT

## 2018-01-01 PROCEDURE — 96375 TX/PRO/DX INJ NEW DRUG ADDON: CPT

## 2018-01-01 PROCEDURE — 96361 HYDRATE IV INFUSION ADD-ON: CPT

## 2018-01-01 PROCEDURE — 96360 HYDRATION IV INFUSION INIT: CPT

## 2018-01-01 PROCEDURE — 71250 CT THORAX DX C-: CPT

## 2018-01-01 PROCEDURE — 80053 COMPREHEN METABOLIC PANEL: CPT

## 2018-01-01 PROCEDURE — 87040 BLOOD CULTURE FOR BACTERIA: CPT

## 2018-01-01 PROCEDURE — 83605 ASSAY OF LACTIC ACID: CPT

## 2018-01-01 PROCEDURE — 83735 ASSAY OF MAGNESIUM: CPT

## 2018-01-01 PROCEDURE — 74011636320 HC RX REV CODE- 636/320: Performed by: EMERGENCY MEDICINE

## 2018-01-01 PROCEDURE — 71045 X-RAY EXAM CHEST 1 VIEW: CPT

## 2018-01-01 PROCEDURE — 99285 EMERGENCY DEPT VISIT HI MDM: CPT

## 2018-01-01 PROCEDURE — 81001 URINALYSIS AUTO W/SCOPE: CPT | Performed by: EMERGENCY MEDICINE

## 2018-01-01 PROCEDURE — 65610000006 HC RM INTENSIVE CARE

## 2018-01-01 PROCEDURE — 84484 ASSAY OF TROPONIN QUANT: CPT

## 2018-01-01 PROCEDURE — 87077 CULTURE AEROBIC IDENTIFY: CPT

## 2018-01-01 PROCEDURE — 82803 BLOOD GASES ANY COMBINATION: CPT | Performed by: INTERNAL MEDICINE

## 2018-01-01 RX ORDER — BRIMONIDINE TARTRATE 2 MG/ML
1 SOLUTION/ DROPS OPHTHALMIC 2 TIMES DAILY
Status: DISCONTINUED | OUTPATIENT
Start: 2018-01-01 | End: 2018-01-01 | Stop reason: HOSPADM

## 2018-01-01 RX ORDER — LEVOTHYROXINE SODIUM 112 UG/1
112 TABLET ORAL
Status: DISCONTINUED | OUTPATIENT
Start: 2018-06-11 | End: 2018-01-01 | Stop reason: HOSPADM

## 2018-01-01 RX ORDER — LANOLIN ALCOHOL/MO/W.PET/CERES
2 CREAM (GRAM) TOPICAL 2 TIMES DAILY
COMMUNITY

## 2018-01-01 RX ORDER — ASCORBIC ACID 500 MG
250 TABLET ORAL 2 TIMES DAILY
Status: DISCONTINUED | OUTPATIENT
Start: 2018-01-01 | End: 2018-01-01 | Stop reason: HOSPADM

## 2018-01-01 RX ORDER — ONDANSETRON 2 MG/ML
4 INJECTION INTRAMUSCULAR; INTRAVENOUS
Status: DISCONTINUED | OUTPATIENT
Start: 2018-01-01 | End: 2018-01-01 | Stop reason: HOSPADM

## 2018-01-01 RX ORDER — SODIUM CHLORIDE 9 MG/ML
125 INJECTION, SOLUTION INTRAVENOUS CONTINUOUS
Status: DISCONTINUED | OUTPATIENT
Start: 2018-01-01 | End: 2018-01-01

## 2018-01-01 RX ORDER — ONDANSETRON 2 MG/ML
4 INJECTION INTRAMUSCULAR; INTRAVENOUS
Status: COMPLETED | OUTPATIENT
Start: 2018-01-01 | End: 2018-01-01

## 2018-01-01 RX ORDER — ENOXAPARIN SODIUM 100 MG/ML
40 INJECTION SUBCUTANEOUS EVERY 24 HOURS
Status: DISCONTINUED | OUTPATIENT
Start: 2018-01-01 | End: 2018-01-01

## 2018-01-01 RX ORDER — SODIUM BICARBONATE 1 MEQ/ML
50 SYRINGE (ML) INTRAVENOUS ONCE
Status: DISCONTINUED | OUTPATIENT
Start: 2018-01-01 | End: 2018-01-01 | Stop reason: HOSPADM

## 2018-01-01 RX ORDER — LEVOFLOXACIN 5 MG/ML
750 INJECTION, SOLUTION INTRAVENOUS
Status: DISCONTINUED | OUTPATIENT
Start: 2018-01-01 | End: 2018-01-01

## 2018-01-01 RX ORDER — LEVOFLOXACIN 5 MG/ML
750 INJECTION, SOLUTION INTRAVENOUS
Status: DISCONTINUED | OUTPATIENT
Start: 2018-06-12 | End: 2018-01-01 | Stop reason: HOSPADM

## 2018-01-01 RX ORDER — ACETAMINOPHEN 325 MG/1
650 TABLET ORAL
Status: COMPLETED | OUTPATIENT
Start: 2018-01-01 | End: 2018-01-01

## 2018-01-01 RX ORDER — METOPROLOL SUCCINATE 50 MG/1
25 TABLET, EXTENDED RELEASE ORAL
Status: DISCONTINUED | OUTPATIENT
Start: 2018-06-11 | End: 2018-01-01 | Stop reason: HOSPADM

## 2018-01-01 RX ORDER — POTASSIUM CHLORIDE 7.45 MG/ML
10 INJECTION INTRAVENOUS
Status: DISPENSED | OUTPATIENT
Start: 2018-01-01 | End: 2018-01-01

## 2018-01-01 RX ORDER — MAGNESIUM SULFATE 100 %
4 CRYSTALS MISCELLANEOUS AS NEEDED
Status: DISCONTINUED | OUTPATIENT
Start: 2018-01-01 | End: 2018-01-01

## 2018-01-01 RX ORDER — LEVOFLOXACIN 5 MG/ML
750 INJECTION, SOLUTION INTRAVENOUS
Status: COMPLETED | OUTPATIENT
Start: 2018-01-01 | End: 2018-01-01

## 2018-01-01 RX ORDER — INSULIN LISPRO 100 [IU]/ML
INJECTION, SOLUTION INTRAVENOUS; SUBCUTANEOUS
Status: DISCONTINUED | OUTPATIENT
Start: 2018-01-01 | End: 2018-01-01

## 2018-01-01 RX ORDER — ACETAMINOPHEN 325 MG/1
650 TABLET ORAL
Status: DISCONTINUED | OUTPATIENT
Start: 2018-01-01 | End: 2018-01-01 | Stop reason: HOSPADM

## 2018-01-01 RX ORDER — SODIUM CHLORIDE 0.9 % (FLUSH) 0.9 %
5-10 SYRINGE (ML) INJECTION EVERY 8 HOURS
Status: DISCONTINUED | OUTPATIENT
Start: 2018-01-01 | End: 2018-01-01 | Stop reason: HOSPADM

## 2018-01-01 RX ORDER — AMOXICILLIN 250 MG
2 CAPSULE ORAL 2 TIMES DAILY
Status: DISCONTINUED | OUTPATIENT
Start: 2018-01-01 | End: 2018-01-01 | Stop reason: HOSPADM

## 2018-01-01 RX ORDER — CYANOCOBALAMIN (VITAMIN B-12) 1000 MCG
1 TABLET, EXTENDED RELEASE ORAL DAILY
COMMUNITY

## 2018-01-01 RX ORDER — AMLODIPINE BESYLATE 5 MG/1
5 TABLET ORAL DAILY
Status: DISCONTINUED | OUTPATIENT
Start: 2018-06-11 | End: 2018-01-01 | Stop reason: HOSPADM

## 2018-01-01 RX ORDER — SODIUM CHLORIDE 0.9 % (FLUSH) 0.9 %
5-10 SYRINGE (ML) INJECTION AS NEEDED
Status: DISCONTINUED | OUTPATIENT
Start: 2018-01-01 | End: 2018-01-01 | Stop reason: HOSPADM

## 2018-01-01 RX ORDER — ENOXAPARIN SODIUM 100 MG/ML
30 INJECTION SUBCUTANEOUS EVERY 24 HOURS
Status: DISCONTINUED | OUTPATIENT
Start: 2018-01-01 | End: 2018-01-01 | Stop reason: HOSPADM

## 2018-01-01 RX ORDER — LEVOTHYROXINE SODIUM 112 UG/1
112 TABLET ORAL
COMMUNITY

## 2018-01-01 RX ORDER — SODIUM CHLORIDE 9 MG/ML
50 INJECTION, SOLUTION INTRAVENOUS
Status: COMPLETED | OUTPATIENT
Start: 2018-01-01 | End: 2018-01-01

## 2018-01-01 RX ORDER — DEXTROSE 50 % IN WATER (D50W) INTRAVENOUS SYRINGE
12.5-25 AS NEEDED
Status: DISCONTINUED | OUTPATIENT
Start: 2018-01-01 | End: 2018-01-01

## 2018-01-01 RX ORDER — FUROSEMIDE 40 MG/1
40 TABLET ORAL
COMMUNITY

## 2018-01-01 RX ORDER — ACETAMINOPHEN 500 MG
500 TABLET ORAL
COMMUNITY

## 2018-01-01 RX ORDER — TRIAMCINOLONE ACETONIDE 1 MG/G
CREAM TOPICAL
Status: DISCONTINUED | OUTPATIENT
Start: 2018-01-01 | End: 2018-01-01 | Stop reason: HOSPADM

## 2018-01-01 RX ORDER — SODIUM CHLORIDE 0.9 % (FLUSH) 0.9 %
10 SYRINGE (ML) INJECTION
Status: COMPLETED | OUTPATIENT
Start: 2018-01-01 | End: 2018-01-01

## 2018-01-01 RX ORDER — TIMOLOL MALEATE 5 MG/ML
1 SOLUTION/ DROPS OPHTHALMIC 2 TIMES DAILY
Status: DISCONTINUED | OUTPATIENT
Start: 2018-01-01 | End: 2018-01-01 | Stop reason: HOSPADM

## 2018-01-01 RX ORDER — MAGNESIUM SULFATE 1 G/100ML
1 INJECTION INTRAVENOUS ONCE
Status: DISCONTINUED | OUTPATIENT
Start: 2018-01-01 | End: 2018-01-01 | Stop reason: HOSPADM

## 2018-01-01 RX ORDER — TRIAMCINOLONE ACETONIDE 1 MG/G
CREAM TOPICAL
COMMUNITY

## 2018-01-01 RX ADMIN — SODIUM CHLORIDE 500 ML: 900 INJECTION, SOLUTION INTRAVENOUS at 11:27

## 2018-01-01 RX ADMIN — ONDANSETRON 4 MG: 2 INJECTION, SOLUTION INTRAMUSCULAR; INTRAVENOUS at 11:27

## 2018-01-01 RX ADMIN — SODIUM CHLORIDE 125 ML/HR: 0.9 INJECTION, SOLUTION INTRAVENOUS at 15:48

## 2018-01-01 RX ADMIN — SODIUM CHLORIDE 50 ML/HR: 900 INJECTION, SOLUTION INTRAVENOUS at 12:13

## 2018-01-01 RX ADMIN — POTASSIUM CHLORIDE 10 MEQ: 10 INJECTION, SOLUTION INTRAVENOUS at 12:29

## 2018-01-01 RX ADMIN — POTASSIUM CHLORIDE 10 MEQ: 10 INJECTION, SOLUTION INTRAVENOUS at 16:48

## 2018-01-01 RX ADMIN — SODIUM CHLORIDE 500 ML: 900 INJECTION, SOLUTION INTRAVENOUS at 12:38

## 2018-01-01 RX ADMIN — Medication 10 ML: at 12:13

## 2018-01-01 RX ADMIN — POTASSIUM CHLORIDE 10 MEQ: 10 INJECTION, SOLUTION INTRAVENOUS at 14:09

## 2018-01-01 RX ADMIN — ACETAMINOPHEN 650 MG: 325 TABLET ORAL at 14:07

## 2018-01-01 RX ADMIN — IOPAMIDOL 100 ML: 755 INJECTION, SOLUTION INTRAVENOUS at 12:13

## 2018-01-01 RX ADMIN — SODIUM CHLORIDE 500 ML: 900 INJECTION, SOLUTION INTRAVENOUS at 16:48

## 2018-01-01 RX ADMIN — LEVOFLOXACIN 750 MG: 5 INJECTION, SOLUTION INTRAVENOUS at 15:48

## 2018-06-10 PROBLEM — R50.9 FEVER: Status: ACTIVE | Noted: 2018-01-01

## 2018-06-10 PROBLEM — R65.20 SEVERE SEPSIS (HCC): Status: ACTIVE | Noted: 2018-01-01

## 2018-06-10 PROBLEM — E87.6 HYPOKALEMIA: Status: ACTIVE | Noted: 2018-01-01

## 2018-06-10 PROBLEM — B34.9 VIRAL SYNDROME: Status: ACTIVE | Noted: 2018-01-01

## 2018-06-10 PROBLEM — A41.9 SEVERE SEPSIS (HCC): Status: ACTIVE | Noted: 2018-01-01

## 2018-06-10 PROBLEM — R09.02 HYPOXIA: Status: ACTIVE | Noted: 2018-01-01

## 2018-06-10 PROBLEM — K59.00 CONSTIPATION: Status: ACTIVE | Noted: 2018-01-01

## 2018-06-10 PROBLEM — E87.20 METABOLIC ACIDOSIS: Status: ACTIVE | Noted: 2018-01-01

## 2018-06-10 PROBLEM — R11.2 INTRACTABLE NAUSEA AND VOMITING: Status: ACTIVE | Noted: 2018-01-01

## 2018-06-10 NOTE — Clinical Note
Status[de-identified] Inpatient [101] Type of Bed: Remote Telemetry [29] Inpatient Hospitalization Certified Necessary for the Following Reasons: 3. Patient receiving treatment that can only be provided in an inpatient setting (further clarification in H&P documentation) Admitting Diagnosis: Severe sepsis ([facility] [address] [[id number]] Admitting Diagnosis: Fever [4252432] Admitting Diagnosis: Hypokalemia [403031] Admitting Diagnosis: Intractable nausea and vomiting [0499083] Admitting Diagnosis: Viral syndrome [[id number]] Admitting Physician: Lorenza Noel [[id number]] Attending Physician: Wandy Reyes [[id number]] Estimated Length of Stay: 3-4 Midnights Discharge Plan[de-identified] [facility]

## 2018-06-10 NOTE — PROGRESS NOTES
Spiritual Care Assessment/Progress Note  [facility]      NAME: Cleo Odell      MRN: [id number]  AGE: 80 y.o. SEX: female  Sikh Affiliation: Nuris Patrick   Language: English     6/10/2018     Total Time (in minutes): 40     Spiritual Assessment begun in [facility] EMERGENCY DEPT through conversation with:         []Patient        [x] Family    [x] Friend(s)        Reason for Consult: Request by staff, Emergency Department visit     Spiritual beliefs: (Please include comment if needed)     [] Identifies with a porfirio tradition:     [] Supported by a porfirio community:      [] Claims no spiritual orientation:      [] Seeking spiritual identity:           [x] Adheres to an individual form of spirituality:      [] Not able to assess:                     Identified resources for coping:      [] Prayer                               [] Music                  [] Guided Imagery     [] Family/friends                 [] Pet visits     [] Devotional reading                         [x] Unknown     [] Other:                                              Interventions offered during this visit: (See comments for more details)    Patient Interventions: Other (comment) (unresponsive)     Family/Friend(s):  Affirmation of emotions/emotional suffering, Affirmation of porfirio, Iconic (affirming the presence of God/Higher Power), Normalization of emotional/spiritual concerns, Prayer (assurance of), Sikh beliefs/image of God discussed     Plan of Care:     [] Support spiritual and/or cultural needs    [] Support AMD and/or advance care planning process      [x] Support grieving process   [] Coordinate Rites and/or Rituals    [] Coordination with community clergy   [] No spiritual needs identified at this time   [] Detailed Plan of Care below (See Comments)  [] Make referral to Music Therapy  [] Make referral to Pet Therapy     [] Make referral to Addiction services  [] Make referral to [facility]  [] Make referral to Spiritual Care Partner  [] No future visits requested        [] Follow up visits as needed     Comments:   Responded to staff request to provide pastoral support in ER. Patient's niece and niece's  were present. Patient's daughter Claude Ammons left yesterday for a vacation in [place]. Patient was unresponsive when I arrived. Nurse Joel Sadler) from [facility] (where patient lives) arrived. Patient  peacefully within a few minutes of my arrival.      Daughter Claude Ammons has been notified. Ana Maria Abbott provided  home information: [facility] in [place]. Provided ministry of presence and grief support. Offered condolences and assurance of prayer. Paged elsewhere in hospital as family was preparing to leave.      CHUCHO Wells, [address] [facility], [address] [place] [address] Paging Service  [phone])

## 2018-06-10 NOTE — ED NOTES
Pt resting on stretcher in POC with call bell in reach. Pt remains on monitor x 3. VSS at this time.

## 2018-06-10 NOTE — PROGRESS NOTES
Pharmacy Clarification of Prior to Admission Medication Regimen     The patient was not interviewed regarding clarification of the prior to admission medication regimen and was not questioned regarding use of any other inhalers, topical products, over the counter medications, herbal medications, vitamin products or ophthalmic/nasal/otic medication use because she is hard of hearing and transfer papers from [facility] containing her medication list were available. Information Obtained From: Rx query, [facility]    Pertinent Pharmacy Findings:   Updated patients preferred outpatient pharmacy to: The patient receives all her medications from [facility] assisted living. [address] [place] [address] [address] [place] [address]. [phone]    PTA medication list was corrected to the following:     Prior to Admission Medications   Prescriptions Last Dose Informant Patient Reported? Taking?   acetaminophen (TYLENOL) 500 mg tablet  Transfer Papers Yes Yes   Sig: Take 500 mg by mouth every six (6) hours as needed for Pain. amLODIPine (NORVASC) 5 mg tablet  Transfer Papers No Yes   Sig: Take 1 Tab by mouth daily. ascorbic acid, vitamin C, (VITAMIN C) 250 mg chew  Transfer Papers Yes Yes   Sig: Take 2 Tabs by mouth two (2) times a day. bisacodyl (BISAC-EVAC) 10 mg suppository  Transfer Papers Yes Yes   Sig: Insert 10 mg into rectum daily as needed (constipation). brimonidine-timolol (COMBIGAN) 0.2-0.5 % drop ophthalmic solution  Transfer Papers Yes Yes   Sig: Administer 1 Drop to right eye every twelve (12) hours. calcium carbonate (MYLANTA) 400 mg chew  Transfer Papers Yes Yes   Sig: Take 1 Tab by mouth two (2) times a day. ergocalciferol (VITAMIN D2) 50,000 unit capsule  Transfer Papers Yes Yes   Sig: Take 50,000 Units by mouth every Sunday. furosemide (LASIX) 40 mg tablet  Transfer Papers Yes Yes   Sig: Take 40 mg by mouth daily as needed (edema).    iron, carbonyl (FEOSOL) 45 mg tab  Transfer Papers Yes Yes   Sig: Take 1 Tab by mouth daily. levothyroxine (SYNTHROID) 112 mcg tablet  Transfer Papers Yes Yes   Sig: Take 112 mcg by mouth Daily (before breakfast). magnesium hydroxide (MILK OF MAGNESIA) 400 mg/5 mL suspension  Transfer Papers Yes Yes   Sig: Take 30 mL by mouth daily as needed for Constipation. metoprolol succinate (TOPROL-XL) 25 mg XL tablet  Transfer Papers Yes Yes   Sig: Take 25 mg by mouth every morning. promethazine (PHENERGAN) 12.5 mg tablet  Transfer Papers Yes Yes   Sig: Take 12.5 mg by mouth every six (6) hours as needed for Nausea. senna-docusate (SENEXON-S) 8.6-50 mg per tablet  Transfer Papers Yes Yes   Sig: Take 2 Tabs by mouth two (2) times a day. sodium phosphate (FLEET ENEMA) 19-7 gram/118 mL enema  Transfer Papers Yes Yes   Sig: Insert 1 Enema into rectum daily as needed (constipation). triamcinolone acetonide (KENALOG) 0.1 % topical cream  Transfer Papers Yes Yes   Sig: Apply  to affected area two (2) times daily as needed for Skin Irritation.  use thin layer      Facility-Administered Medications: None          Thank you,  [address]  Intern

## 2018-06-10 NOTE — ED PROVIDER NOTES
EMERGENCY DEPARTMENT HISTORY AND PHYSICAL EXAM      Date: 6/10/2018  Patient Name: Syl Shaw    History of Presenting Illness     Chief Complaint   Patient presents with    Vomiting       History Provided By: Patient and EMS    HPI: Syl Shaw, 80 y.o. female with PMHx significant for HTN, hypothyroidism, cholelithiasis, presents by EMS to the ED with cc of vomiting, fever. History is slightly limited as patient is extremely hard of hearing. Per EMS patient was feeling slightly \"queasy\" last night and then this morning was found to be drowsy with multiple episodes of emesis and fever of 103. Patient denies any symptoms other than nausea or emesis. Specifically denies chest pain, shortness of breath, abdominal pain, diarrhea, constipation. Chief Complaint: Vomiting, fever  Duration: 4 Hours  Timing:  Acute  Associated Symptoms: denies any other associated signs or symptoms      There are no other complaints, changes, or physical findings at this time. PCP: Kelly Novoa MD    Current Facility-Administered Medications   Medication Dose Route Frequency Provider Last Rate Last Dose    potassium chloride 10 mEq in 100 ml IVPB  10 mEq IntraVENous Chanda Ugalde  mL/hr at 06/10/18 1409 10 mEq at 06/10/18 1409    0.9% sodium chloride infusion  125 mL/hr IntraVENous CONTINUOUS Amber Daly MD        aztreonam (AZACTAM) 1 g in 0.9% sodium chloride (MBP/ADV) 100 mL  1 g IntraVENous Malcom Chaney MD        levoFLOXacin (LEVAQUIN) 750 mg in D5W IVPB  750 mg IntraVENous NOW Amber Daly MD         Current Outpatient Prescriptions   Medication Sig Dispense Refill    levothyroxine (SYNTHROID) 112 mcg tablet Take 112 mcg by mouth Daily (before breakfast).  sodium phosphate (FLEET ENEMA) 19-7 gram/118 mL enema Insert 1 Enema into rectum daily as needed (constipation).  calcium carbonate (MYLANTA) 400 mg chew Take 1 Tab by mouth two (2) times a day.       iron, carbonyl (FEOSOL) 45 mg tab Take 1 Tab by mouth daily.  ascorbic acid, vitamin C, (VITAMIN C) 250 mg chew Take 2 Tabs by mouth two (2) times a day.  acetaminophen (TYLENOL) 500 mg tablet Take 500 mg by mouth every six (6) hours as needed for Pain.  furosemide (LASIX) 40 mg tablet Take 40 mg by mouth daily as needed (edema).  triamcinolone acetonide (KENALOG) 0.1 % topical cream Apply  to affected area two (2) times daily as needed for Skin Irritation. use thin layer      amLODIPine (NORVASC) 5 mg tablet Take 1 Tab by mouth daily. 30 Tab 0    senna-docusate (SENEXON-S) 8.6-50 mg per tablet Take 2 Tabs by mouth two (2) times a day.  brimonidine-timolol (COMBIGAN) 0.2-0.5 % drop ophthalmic solution Administer 1 Drop to right eye every twelve (12) hours.  bisacodyl (BISAC-EVAC) 10 mg suppository Insert 10 mg into rectum daily as needed (constipation).  magnesium hydroxide (MILK OF MAGNESIA) 400 mg/5 mL suspension Take 30 mL by mouth daily as needed for Constipation.  promethazine (PHENERGAN) 12.5 mg tablet Take 12.5 mg by mouth every six (6) hours as needed for Nausea.  metoprolol succinate (TOPROL-XL) 25 mg XL tablet Take 25 mg by mouth every morning.  ergocalciferol (VITAMIN D2) 50,000 unit capsule Take 50,000 Units by mouth every Sunday.          Past History     Past Medical History:  Past Medical History:   Diagnosis Date    Actinic keratoses 3/19/2010    Cholelithiases 3/19/2010    Decreased hearing 3/19/2010    Diffuse cystic mastopathy     Elevated C-reactive protein (CRP) 5/02    Glaucoma 3/19/2010    History of kidney stones     HTN (hypertension) 3/19/2010    Macular puckering of retina, right eye     Stress incontinence 3/19/2010    Unspecified hypothyroidism     Vitamin D deficiency 10/6/2010       Past Surgical History:  Past Surgical History:   Procedure Laterality Date    ENDOSCOPY, COLON, DIAGNOSTIC  5/1/06    HX APPENDECTOMY      HX CATARACT REMOVAL      HX CYST INCISION AND DRAINAGE      left breast x 2    HX DINESH AND BSO      over several procedures    HX TONSILLECTOMY      HX TRABECULECTOMY  1990    laser OD       Family History:  Family History   Problem Relation Age of Onset    Heart Disease Mother     Cancer Brother      lung    Cancer Sister      gallbladder  12    Diabetes Sister    [de-identified] Cancer Sister      colon at 48    Cancer Brother      leukemia       Social History:  Social History   Substance Use Topics    Smoking status: Former Smoker     Packs/day: 1.50     Years: 20.00     Quit date: 1982    Smokeless tobacco: Never Used    Alcohol use No       Allergies: Allergies   Allergen Reactions    Codeine Itching    Ibuprofen Other (comments)     kidney failure    Pcn [Penicillins] Hives    Sulfur Not Reported This Time         Review of Systems   Review of Systems   Reason unable to perform ROS: ROS somewhat limited due to patient's hearing disability. Constitutional: Positive for activity change, chills and fever. Respiratory: Negative for cough and shortness of breath. Cardiovascular: Negative for chest pain. Gastrointestinal: Positive for nausea and vomiting. Negative for abdominal pain, constipation and diarrhea. Genitourinary: Negative for dysuria and frequency. Skin: Negative for rash. Physical Exam   Physical Exam   Constitutional:   Elderly woman, appears weak and very uncomfortable and fatigued   HENT:   Head: Normocephalic and atraumatic. Eyes: Conjunctivae and EOM are normal.   Neck: Normal range of motion. Neck supple. Cardiovascular: Intact distal pulses. Tachycardic, regular rhythm   Pulmonary/Chest: Breath sounds normal. No respiratory distress. Abdominal: Soft. She exhibits no distension. There is no tenderness. Neurological:   Drowsy but arousable, oriented to situation   Skin: Skin is warm and dry.        Diagnostic Study Results     Labs -     Recent Results (from the past 12 hour(s))   CK W/ REFLX CKMB    Collection Time: 06/10/18 11:13 AM   Result Value Ref Range    CK 38 26 - 192 U/L   TROPONIN I    Collection Time: 06/10/18 11:13 AM   Result Value Ref Range    Troponin-I, Qt. <0.04 <0.05 ng/mL   LACTIC ACID    Collection Time: 06/10/18 11:13 AM   Result Value Ref Range    Lactic acid 4.2 (HH) 0.4 - 2.0 MMOL/L   METABOLIC PANEL, COMPREHENSIVE    Collection Time: 06/10/18 11:13 AM   Result Value Ref Range    Sodium 142 136 - 145 mmol/L    Potassium 2.7 (LL) 3.5 - 5.1 mmol/L    Chloride 105 97 - 108 mmol/L    CO2 25 21 - 32 mmol/L    Anion gap 12 5 - 15 mmol/L    Glucose 142 (H) 65 - 100 mg/dL    BUN 16 6 - 20 MG/DL    Creatinine 1.19 (H) 0.55 - 1.02 MG/DL    BUN/Creatinine ratio 13 12 - 20      GFR est AA 52 (L) >60 ml/min/1.73m2    GFR est non-AA 43 (L) >60 ml/min/1.73m2    Calcium 8.7 8.5 - 10.1 MG/DL    Bilirubin, total 1.4 (H) 0.2 - 1.0 MG/DL    ALT (SGPT) 31 12 - 78 U/L    AST (SGOT) 45 (H) 15 - 37 U/L    Alk.  phosphatase 144 (H) 45 - 117 U/L    Protein, total 7.3 6.4 - 8.2 g/dL    Albumin 3.5 3.5 - 5.0 g/dL    Globulin 3.8 2.0 - 4.0 g/dL    A-G Ratio 0.9 (L) 1.1 - 2.2     MAGNESIUM    Collection Time: 06/10/18 11:13 AM   Result Value Ref Range    Magnesium 1.6 1.6 - 2.4 mg/dL   LIPASE    Collection Time: 06/10/18 11:13 AM   Result Value Ref Range    Lipase 119 73 - 393 U/L   CBC WITH AUTOMATED DIFF    Collection Time: 06/10/18 11:13 AM   Result Value Ref Range    WBC 11.0 3.6 - 11.0 K/uL    RBC 5.15 3.80 - 5.20 M/uL    HGB 14.0 11.5 - 16.0 g/dL    HCT 43.1 35.0 - 47.0 %    MCV 83.7 80.0 - 99.0 FL    MCH 27.2 26.0 - 34.0 PG    MCHC 32.5 30.0 - 36.5 g/dL    RDW 15.3 (H) 11.5 - 14.5 %    PLATELET 692 088 - 754 K/uL    MPV 11.2 8.9 - 12.9 FL    NRBC 0.0 0  WBC    ABSOLUTE NRBC 0.00 0.00 - 0.01 K/uL    NEUTROPHILS 78 (H) 32 - 75 %    BAND NEUTROPHILS 7 %    LYMPHOCYTES 11 (L) 12 - 49 %    MONOCYTES 3 (L) 5 - 13 %    EOSINOPHILS 0 0 - 7 %    BASOPHILS 1 0 - 1 % IMMATURE GRANULOCYTES 0 0.0 - 0.5 %    ABS. NEUTROPHILS 9.4 (H) 1.8 - 8.0 K/UL    ABS. LYMPHOCYTES 1.2 0.8 - 3.5 K/UL    ABS. MONOCYTES 0.3 0.0 - 1.0 K/UL    ABS. EOSINOPHILS 0.0 0.0 - 0.4 K/UL    ABS. BASOPHILS 0.1 0.0 - 0.1 K/UL    ABS. IMM. GRANS. 0.0 0.00 - 0.04 K/UL    DF MANUAL      RBC COMMENTS ANISOCYTOSIS  1+       EKG, 12 LEAD, INITIAL    Collection Time: 06/10/18 11:13 AM   Result Value Ref Range    Ventricular Rate 101 BPM    Atrial Rate 101 BPM    P-R Interval 152 ms    QRS Duration 86 ms    Q-T Interval 382 ms    QTC Calculation (Bezet) 495 ms    Calculated P Axis 88 degrees    Calculated R Axis -62 degrees    Calculated T Axis 74 degrees    Diagnosis       Sinus tachycardia with premature atrial complexes  Left axis deviation  Pulmonary disease pattern  Moderate voltage criteria for LVH, may be normal variant  Abnormal ECG  When compared with ECG of 03-APR-2017 15:40,  Significant changes have occurred     URINALYSIS W/ REFLEX CULTURE    Collection Time: 06/10/18 12:21 PM   Result Value Ref Range    Color YELLOW/STRAW      Appearance CLEAR CLEAR      Specific gravity 1.009 1.003 - 1.030      pH (UA) 6.5 5.0 - 8.0      Protein 30 (A) NEG mg/dL    Glucose NEGATIVE  NEG mg/dL    Ketone NEGATIVE  NEG mg/dL    Bilirubin NEGATIVE  NEG      Blood NEGATIVE  NEG      Urobilinogen 0.2 0.2 - 1.0 EU/dL    Nitrites NEGATIVE  NEG      Leukocyte Esterase NEGATIVE  NEG      WBC 0-4 0 - 4 /hpf    RBC 0-5 0 - 5 /hpf    Epithelial cells FEW FEW /lpf    Bacteria NEGATIVE  NEG /hpf    UA:UC IF INDICATED CULTURE NOT INDICATED BY UA RESULT CNI         Radiologic Studies -   CT CHEST WO CONT   Final Result      CT ABD PELV W CONT   Final Result      XR CHEST PORT   Final Result        CT Results  (Last 48 hours)               06/10/18 1517  CT CHEST WO CONT Final result    Impression:  IMPRESSION:    1.  There is no acute abnormality in the chest.       2. A patchy residual left nephrogram is partially visualized and results the   possibility of impaired renal function. Correlate with laboratory data. Narrative:  EXAM:  CT thorax without contrast       INDICATION:  Fever and tachypnea. COMPARISON: Chest radiograph 6/10/2018. CT abdomen/pelvis 6/10/2018. TECHNIQUE: Helical CT of the chest is performed without intravenous contrast.   Coronal and sagittal reformatted images are obtained. CT dose reduction was   achieved through use of a standardized protocol tailored for this examination   and automatic exposure control for dose modulation. FINDINGS:    The aorta is normal in caliber with atherosclerosis. The main pulmonary artery   is normal in caliber. There is coronary artery atherosclerosis. The heart size   is normal.  There is no pericardial or pleural effusion. There are no enlarged axillary, mediastinal, or hilar lymph nodes. There is mild bilateral dependent atelectasis. There is no lung nodule, mass, or   consolidation. There is no pneumothorax. The central airways are clear. There is a small hiatal hernia in the distal esophagus is fluid-filled. Patchy residual left nephrogram is partially visualized following contrast   administration for recent CT. There is no other acute abnormality in the   visualized upper abdomen. The osseous structures are age-appropriate.           06/10/18 1316  CT ABD PELV W CONT Final result    Impression:  IMPRESSION:    1. Moderate to large amount of rectal stool. There is no bowel obstruction or   other acute abnormality in the abdomen or pelvis. 2. Cholelithiasis. Narrative:  EXAM:  CT ABD PELV W CONT       INDICATION: Nausea and vomiting. COMPARISON: CT 10/14/2011. TECHNIQUE: Helical CT of the abdomen  and pelvis  following the uneventful   intravenous administration of nonionic contrast.  Coronal and sagittal reformats   are performed. Delayed axial CT of the abdomen is performed.  CT dose reduction   was achieved through use of a standardized protocol tailored for this   examination and automatic exposure control for dose modulation. FINDINGS:    The visualized lung bases demonstrate no mass or consolidation. The heart size   is normal. There is no pericardial or pleural effusion. There is a small hiatal   hernia. The liver, spleen, pancreas, and adrenal glands are normal. There are gallstones    without intra- or extra-hepatic biliary dilatation. The kidneys are symmetric without hydronephrosis. Left kidney peripelvic cysts   are again noted. There is a moderate to large amount of rectal stool. There are no dilated bowel   loops. The appendix is surgically absent. There is distal colonic   diverticulosis without focal adjacent inflammation. There are no enlarged lymph nodes. There is no free fluid or free air. The   aorta tapers without aneurysm. There is aortoiliac atherosclerosis. The urinary bladder is normal.  There is no pelvic mass. The uterus is   surgically absent. There is a small fat-containing left inguinal hernia. Bilateral femoral   orthopedic hardware is present. There are degenerative changes in the spine and   hips without aggressive bony lesion. CXR Results  (Last 48 hours)               06/10/18 1126  XR CHEST PORT Final result    Impression:  IMPRESSION:       No acute process. Stable exam.           Narrative:  EXAM:  XR CHEST PORT       INDICATION:  Vomiting. SIRS. COMPARISON: 4/3/2017       TECHNIQUE: Portable AP upright chest view at 1124 hours       FINDINGS: Cardiac monitoring wires overlie the thorax. The cardiomediastinal   contours are stable. The pulmonary vasculature is within normal limits. The lungs and pleural spaces are clear. There is no pneumothorax. The bones and   upper abdomen are stable. Medical Decision Making   I am the first provider for this patient.     I reviewed the vital signs, available nursing notes, past medical history, past surgical history, family history and social history. Vital Signs-Reviewed the patient's vital signs. Patient Vitals for the past 12 hrs:   Temp Pulse Resp BP SpO2   06/10/18 1400 - (!) 106 25 105/43 98 %   06/10/18 1245 - (!) 103 (!) 32 135/47 96 %   06/10/18 1215 (!) 103.9 °F (39.9 °C) (!) 111 27 155/48 98 %   06/10/18 1123 - (!) 101 29 - 92 %   06/10/18 1106 - (!) 105 26 - (!) 88 %   06/10/18 1058 (!) 100.7 °F (38.2 °C) (!) 106 (!) 31 109/58 (!) 88 %   06/10/18 1044 - - - - (!) 88 %       Pulse Oximetry Analysis - 98% on RA    Cardiac Monitor:   Rate: 108 bpm  Rhythm: Sinus Tachycardia     EKG interpretation: (Preliminary)  Rhythm: sinus tachycardia; and regular . Rate (approx.): 101; Axis: left axis deviation; AL interval: normal; QRS interval: normal ; ST/T wave: normal; Other findings: premature atrial complexes. Records Reviewed: Nursing Notes and Old Medical Records    Provider Notes (Medical Decision Making):   67GH F without significant PMH and with good baseline functional status who comes in drowsy, febrile, tachycardic. Concern for sepsis, dehydration. Will get basic labs including lactate, cultures. While there is no abdominal tenderness with deep palpation, will get scan of abdomen due to ill-appearance. Will treat with fluids, antiemetics, antibiotics. Will likely require admission. ED Course:   Initial assessment performed. The patients presenting problems have been discussed, and they are in agreement with the care plan formulated and outlined with them. I have encouraged them to ask questions as they arise throughout their visit. Progress Note:  2:19 PM  Discussed antibiotics with pharmacy to treat for sepsis with unidentified source. Patient is allergic to Penicillin and has never had a Cephalosporin. will treat with Aztreonam and Levofloxacin.   Written by Markie Mcduffie ED Scrrichi, as dictated by Sandor Garcia, MD.      CONSULT NOTE:   2:33 PM  Tae Avila MD spoke with Dr. Shayy Crabtree   Specialty: Hospitalist  Discussed pt's hx, disposition, and available diagnostic and imaging results. Reviewed care plans. Consultant agrees with plans as outlined. Will evaluate for admission. Written by Tae Avila MD    PROGRESS NOTE:  5:18 AM  Discussed pt's hx, disposition, and available diagnostic and imaging results with Dr. Prem Love. Reviewed care plans. Agrees with plans as outlined. Care of pt transferred to Dr. Prem Love. Written by Ade Reece MD      3:45 PM - Patient's clinical condition decompensating. Patient is hypoxic, unresponsive. She was placed on NRB with mild improvement. Patient has a signed DNR on file and this has been discussed with the niece who is at the bedside and agrees that these are patient's wishes: not to be intubated or resuscitated. Unfortunately we are unable to reach daughter as [place] at this time. 5:07 PM - Patient was noted to become apneic, then developed Vtach, then PEA, then asystole. Family and friends at bedside and all questions answered. Time of death called at 5:07PM with no cardiac activity noted on the monitor, no respiratory activity, and no heart sounds. Disposition:      Diagnosis     Clinical Impression:   1. Sepsis, due to unspecified organism [facility])    2. Cardiac arrest [facility])        Attestations:          ------------------------------------------  Begin Attending Documentation  ------------------------------------------    Attending Attestation: I was not present during the patient's evaluation by the resident. I personally evaluated the patient including the history and physical. I have read the resident's note and agree with their history, physical and plan. HPI: Patient is a 81 yo female who presents to ED with n/v and fever x today. Per EMS, patient also had diarrhea.   Since arrival to ED, she has had one formed bowel movement. She is actively vomiting upon ED arrival.  Patient denies abdominal pain, chest pain, dyspnea, hematemesis, hematochezia. Patient is very [address] so history somewhat limited. PE:  Gen: Elderly women lying on stretcher, appears nauseous   Heart: tachycardic, regular rhythm, no m/r/g   Lungs: CTAB, no w/r/r   Abd: soft, nttp, ND   Ext: no swelling   Skin: no rashes, no evidence of cellulitis   Neuro: alert, oriented, grossly intact, no focal deficits    Assessment: Patient is a 79 yo female who presents to ED with n/v. She denies abdominal pain, chest pain, dyspnea. She is febrile but has not reproducible tenderness on abdominal exam.  Differential is broad and includes viral gastroenteritis, colitis, ischemia, UTI, obstruction, ileus, pneumonia, dehydration, electrolyte abnormality, BONG, ACS. Will perform sepsis work up including labs, cultures, UA. Will obtain CT a/p. Will treat with IVF. Antibiotics as indicated. Disposition: Suspect sepsis due to gastroenteritis. Labs indicate elevated lactate, likely due to sepsis, n/v.  Potassium 2.7 - repletion started IV. CXR, UA, CT a/p and CT chest unremarkable. Given SIRS criteria and allergies, will treat with aztreonam and vancomycin although suspect viral etiology. Continue IVF. Patient admitted for further management.     Diagnosis: Gastroenteritis, sepsis    Nicole Velasquez MD.    ------------------------------------------  End Attending Documentation  ------------------------------------------

## 2018-06-10 NOTE — PROGRESS NOTES
Pharmacy  Enoxaparin (Lovenox®) Monitoring/Dosing      Indication: VTE ppx     Current Dose: Enoxaparin 40 mg subcutaneously every 24 hours    Creatinine Clearance (mL/min): 26 mL/min       Labs:  Recent Labs      06/10/18   1113   CREA  1.19*   HGB  14.0   PLT  222     Wt Readings from Last 1 Encounters:   06/10/18 70.8 kg (156 lb 1.4 oz)     Ht Readings from Last 1 Encounters:   06/10/18 160 cm (63\")       Impression/Plan:   · Will change to 30 mg daily for CrCl < 30 mL/min per renal dosing protocol. Thanks,  Ronald Muhammad PHARMD      [email or web address]%[email or web address]

## 2018-06-10 NOTE — H&P
Hospitalist Admission Note    NAME: Georgina Rodriguez   :  1927   MRN:  [id number]     Date/Time:  6/10/2018 3:48 PM    Patient PCP: Sue Tay MD  ______________________________________________________________________  Given the patient's current clinical presentation, I have a high level of concern for decompensation if discharged from the emergency department. Complex decision making was performed, which includes reviewing the patient's available past medical records, laboratory results, and x-ray films. My assessment of this patient's clinical condition and my plan of care is as follows.     Assessment / Plan:  Acute Gastroenteritis POA- likely Viral syndrome  Causing Severe Sepsis POA  Chronic Cholelithiasis POA  Acute resp failure with Hypoxia - 88% on arrival, ?etiology, neg CT Chest, CXR  High Fever POA  Sinus Tachycardia POA  Lactic acidosis POA  WBC= 11  Fever 103  Lact= 4.6  EKG= Sinus tachy, LVH+  Trop neg  T bili= 1.4  UA neg  Blood Cx pending  CXR neg  CT chest neg  CT A/P= neg acute, Cholelithiasis    Initially was being admitted to [facility] bed- now transfer to ICU as pt deteriorating rapidly  S/p 1L NS bolus in ER, cont maintenance IVF at 125ml/hr for now  Serial Lactate as per protocol till <2.0  Empiric IV Abx- aztreonam + levaquin as pt is PCN allergic  Supportive care- tylenol, IV zofran prn  Consider Abd-RUQ USG if not improved in 24 hrs  Liquid diet as tolerated- otherwise NPO  Holding home Lasix  Oxygen support    Hypokalemia POA- severe  Hypomagnesemia POA-mild/borderline  Replenish K+ IV as in started in ER  Replenish IV Mag 1g x 1  Recheck BMP with Mag in AM    Chronic Constipation- pt had moved her bowels in ER on arrival as per family at bedside  Cont aggressive Bowel regimen  CT A/P noted for large Stools in Rectum    HTN  Cont home BP meds in AM if not hypotensive  Holding lasix    Hypothyroidism  Cont synthroid    Glaucoma  Cont home eyedrops    Code Status: DNR in connect care , confirmed with Pt & family at bedside in ER  Surrogate Decision Maker: daughter Jt Amor (out of country at the moment), local contact Niece # [phone]    DVT Prophylaxis: SQ lovenox  GI Prophylaxis: not indicated    Baseline: Pt lives in [facility] ([address]) [facility] as long term resident      Subjective:   CHIEF COMPLAINT: Nausea with Vomiting with high fever at [facility] x 1 day    HISTORY OF PRESENT ILLNESS:     Nini Bey is a 80 y.o.  female who presents with above complains from [address] [place] at [place] via EMS. Pt was sent with CC of sudden onset Nausea with Vomiting at the [facility] with associated high fever. Pt was at her usual state of health till yesterday - when she had her last PO meal in afternoon- after which she started throwing up - sudden onset. Associated Fever & gen weakness. Denies any cough, sputum, diarrhea. Urinary symptoms. Pt has h/o chronic constipation - is on aggressive bowel regimen at [facility] & has been going regularly till few days ago when she has gotten constipated but still passing stools as per family at bedside. Pt was found to have Fever of 103 in ER with elevated lactate 4.2 & mild elevated TBili 1.4 with Hypokalemia & Transient Hypoxia in ER. Also relatively neg workup in ER including CT A/P, CT chest, UA, CXR,& EKG. We were asked to admit for work up and evaluation of the above problems.      Past Medical History:   Diagnosis Date    Actinic keratoses 3/19/2010    Cholelithiases 3/19/2010    Decreased hearing 3/19/2010    Diffuse cystic mastopathy     Elevated C-reactive protein (CRP) 5/02    Glaucoma 3/19/2010    History of kidney stones     HTN (hypertension) 3/19/2010    Macular puckering of retina, right eye     Stress incontinence 3/19/2010    Unspecified hypothyroidism     Vitamin D deficiency 10/6/2010        Past Surgical History:   Procedure Laterality Date    ENDOSCOPY, COLON, DIAGNOSTIC 06    HX APPENDECTOMY      HX CATARACT REMOVAL      HX CYST INCISION AND DRAINAGE      left breast x 2    HX DINESH AND BSO      over several procedures    HX TONSILLECTOMY      HX TRABECULECTOMY  1990    laser OD       Social History   Substance Use Topics    Smoking status: Former Smoker     Packs/day: 1.50     Years: 20.00     Quit date: 1982    Smokeless tobacco: Never Used    Alcohol use No        Family History   Problem Relation Age of Onset    Heart Disease Mother     Cancer Brother      lung    Cancer Sister      gallbladder  12    Diabetes Sister     Cancer Sister      colon at 48    Cancer Brother      leukemia     Allergies   Allergen Reactions    Codeine Itching    Ibuprofen Other (comments)     kidney failure    Pcn [Penicillins] Hives    Sulfur Not Reported This Time        Prior to Admission medications    Medication Sig Start Date End Date Taking? Authorizing Provider   levothyroxine (SYNTHROID) 112 mcg tablet Take 112 mcg by mouth Daily (before breakfast). Yes Historical Provider   sodium phosphate (FLEET ENEMA) 19-7 gram/118 mL enema Insert 1 Enema into rectum daily as needed (constipation). Yes Historical Provider   calcium carbonate (MYLANTA) 400 mg chew Take 1 Tab by mouth two (2) times a day. Yes Historical Provider   iron, carbonyl (FEOSOL) 45 mg tab Take 1 Tab by mouth daily. Yes Historical Provider   ascorbic acid, vitamin C, (VITAMIN C) 250 mg chew Take 2 Tabs by mouth two (2) times a day. Yes Historical Provider   acetaminophen (TYLENOL) 500 mg tablet Take 500 mg by mouth every six (6) hours as needed for Pain. Yes Historical Provider   furosemide (LASIX) 40 mg tablet Take 40 mg by mouth daily as needed (edema). Yes Historical Provider   triamcinolone acetonide (KENALOG) 0.1 % topical cream Apply  to affected area two (2) times daily as needed for Skin Irritation.  use thin layer   Yes Historical Provider   amLODIPine (NORVASC) 5 mg tablet Take 1 Tab by mouth daily. 4/6/17  Yes Lacie Wells MD   senna-docusate (SENEXON-S) 8.6-50 mg per tablet Take 2 Tabs by mouth two (2) times a day. 4/6/17  Yes Gisel Meek NP   brimonidine-timolol (COMBIGAN) 0.2-0.5 % drop ophthalmic solution Administer 1 Drop to right eye every twelve (12) hours. Yes Tyler Muller MD   bisacodyl (BISAC-EVAC) 10 mg suppository Insert 10 mg into rectum daily as needed (constipation). Yes Tyler Muller MD   magnesium hydroxide (MILK OF MAGNESIA) 400 mg/5 mL suspension Take 30 mL by mouth daily as needed for Constipation. Yes Tyler Muller MD   promethazine (PHENERGAN) 12.5 mg tablet Take 12.5 mg by mouth every six (6) hours as needed for Nausea. Yes Tyler Muller MD   metoprolol succinate (TOPROL-XL) 25 mg XL tablet Take 25 mg by mouth every morning. Yes Tyler Muller MD   ergocalciferol (VITAMIN D2) 50,000 unit capsule Take 50,000 Units by mouth every Sunday.    Yes Tyler Muller MD       REVIEW OF SYSTEMS:           Total of 12 systems reviewed as follows:       POSITIVE= underlined text  Negative = text not underlined  General:  fever, chills, sweats, generalized weakness, weight loss/gain,      loss of appetite   Eyes:    blurred vision, eye pain, loss of vision, double vision  ENT:    rhinorrhea, pharyngitis   Respiratory:   cough, sputum production, SOB, GUTIERRES, wheezing, pleuritic pain   Cardiology:   chest pain, palpitations, orthopnea, PND, edema, syncope   Gastrointestinal:  abdominal pain , N/V, diarrhea, dysphagia, constipation, bleeding   Genitourinary:  frequency, urgency, dysuria, hematuria, incontinence   Muskuloskeletal :  arthralgia, myalgia, back pain  Hematology:  easy bruising, nose or gum bleeding, lymphadenopathy   Dermatological: rash, ulceration, pruritis, color change / jaundice  Endocrine:   hot flashes or polydipsia   Neurological:  headache, dizziness, confusion, focal weakness, paresthesia,     Speech difficulties, memory loss, gait difficulty  Psychological: Feelings of anxiety, depression, agitation    Objective:   VITALS:    Visit Vitals    /43 (BP 1 Location: Left arm, BP Patient Position: At rest)    Pulse (!) 106    Temp (!) 103.9 °F (39.9 °C)    Resp 25    Ht 5' 3\" (1.6 m)    Wt 70.8 kg (156 lb 1.4 oz)    SpO2 98%    BMI 27.65 kg/m2       PHYSICAL EXAM:    General:    Alert, cooperative, mild distress, appears stated age, Frail looking +     HEENT: Atraumatic, anicteric sclerae, pink conjunctivae, Hard of Hearing +     No oral ulcers, mucosa dry +t, throat clear, dentition fair  Neck:  Supple, symmetrical,  thyroid: non tender  Lungs:   Clear to auscultation bilaterally. No Wheezing or Rhonchi. No rales. Chest wall:  No tenderness  No Accessory muscle use. Heart:   Regular  rhythm,  No  murmur   No edema  Abdomen:   Soft, non-tender. Not distended. Bowel sounds normal  Extremities: No cyanosis. No clubbing,      Skin turgor normal, Capillary refill normal, Radial dial pulse 2+  Skin:     Not pale. Not Jaundiced  No rashes   Psych:  Fair insight. Not depressed. Not anxious or agitated. Neurologic: EOMs intact. No facial asymmetry. No aphasia or slurred speech. Symmetrical strength, Sensation grossly intact.  Alert and oriented X 1.     _______________________________________________________________________  Care Plan discussed with:    Comments   Patient x    Family  x Niece at bedside in ER   RN x    Care Manager                    Consultant:      _______________________________________________________________________  Expected  Disposition:   Home with Family    HH/PT/OT/RN    SNF/LTC x   [facility]    ________________________________________________________________________  TOTAL TIME:  72 Minutes    Critical Care Provided     Minutes non procedure based      Comments    x Reviewed previous records   >50% of visit spent in counseling and coordination of care x Discussion with patient and family and questions answered ________________________________________________________________________  Signed: Jorge A Dumas MD    Procedures: see electronic medical records for all procedures/Xrays and details which were not copied into this note but were reviewed prior to creation of Plan. LAB DATA REVIEWED:    Recent Results (from the past 24 hour(s))   CK W/ REFLX CKMB    Collection Time: 06/10/18 11:13 AM   Result Value Ref Range    CK 38 26 - 192 U/L   TROPONIN I    Collection Time: 06/10/18 11:13 AM   Result Value Ref Range    Troponin-I, Qt. <0.04 <0.05 ng/mL   LACTIC ACID    Collection Time: 06/10/18 11:13 AM   Result Value Ref Range    Lactic acid 4.2 (HH) 0.4 - 2.0 MMOL/L   METABOLIC PANEL, COMPREHENSIVE    Collection Time: 06/10/18 11:13 AM   Result Value Ref Range    Sodium 142 136 - 145 mmol/L    Potassium 2.7 (LL) 3.5 - 5.1 mmol/L    Chloride 105 97 - 108 mmol/L    CO2 25 21 - 32 mmol/L    Anion gap 12 5 - 15 mmol/L    Glucose 142 (H) 65 - 100 mg/dL    BUN 16 6 - 20 MG/DL    Creatinine 1.19 (H) 0.55 - 1.02 MG/DL    BUN/Creatinine ratio 13 12 - 20      GFR est AA 52 (L) >60 ml/min/1.73m2    GFR est non-AA 43 (L) >60 ml/min/1.73m2    Calcium 8.7 8.5 - 10.1 MG/DL    Bilirubin, total 1.4 (H) 0.2 - 1.0 MG/DL    ALT (SGPT) 31 12 - 78 U/L    AST (SGOT) 45 (H) 15 - 37 U/L    Alk.  phosphatase 144 (H) 45 - 117 U/L    Protein, total 7.3 6.4 - 8.2 g/dL    Albumin 3.5 3.5 - 5.0 g/dL    Globulin 3.8 2.0 - 4.0 g/dL    A-G Ratio 0.9 (L) 1.1 - 2.2     MAGNESIUM    Collection Time: 06/10/18 11:13 AM   Result Value Ref Range    Magnesium 1.6 1.6 - 2.4 mg/dL   LIPASE    Collection Time: 06/10/18 11:13 AM   Result Value Ref Range    Lipase 119 73 - 393 U/L   CBC WITH AUTOMATED DIFF    Collection Time: 06/10/18 11:13 AM   Result Value Ref Range    WBC 11.0 3.6 - 11.0 K/uL    RBC 5.15 3.80 - 5.20 M/uL    HGB 14.0 11.5 - 16.0 g/dL    HCT 43.1 35.0 - 47.0 %    MCV 83.7 80.0 - 99.0 FL    MCH 27.2 26.0 - 34.0 PG    MCHC 32.5 30.0 - 36.5 g/dL RDW 15.3 (H) 11.5 - 14.5 %    PLATELET 343 539 - 355 K/uL    MPV 11.2 8.9 - 12.9 FL    NRBC 0.0 0  WBC    ABSOLUTE NRBC 0.00 0.00 - 0.01 K/uL    NEUTROPHILS 78 (H) 32 - 75 %    BAND NEUTROPHILS 7 %    LYMPHOCYTES 11 (L) 12 - 49 %    MONOCYTES 3 (L) 5 - 13 %    EOSINOPHILS 0 0 - 7 %    BASOPHILS 1 0 - 1 %    IMMATURE GRANULOCYTES 0 0.0 - 0.5 %    ABS. NEUTROPHILS 9.4 (H) 1.8 - 8.0 K/UL    ABS. LYMPHOCYTES 1.2 0.8 - 3.5 K/UL    ABS. MONOCYTES 0.3 0.0 - 1.0 K/UL    ABS. EOSINOPHILS 0.0 0.0 - 0.4 K/UL    ABS. BASOPHILS 0.1 0.0 - 0.1 K/UL    ABS. IMM.  GRANS. 0.0 0.00 - 0.04 K/UL    DF MANUAL      RBC COMMENTS ANISOCYTOSIS  1+       EKG, 12 LEAD, INITIAL    Collection Time: 06/10/18 11:13 AM   Result Value Ref Range    Ventricular Rate 101 BPM    Atrial Rate 101 BPM    P-R Interval 152 ms    QRS Duration 86 ms    Q-T Interval 382 ms    QTC Calculation (Bezet) 495 ms    Calculated P Axis 88 degrees    Calculated R Axis -62 degrees    Calculated T Axis 74 degrees    Diagnosis       Sinus tachycardia with premature atrial complexes  Left axis deviation  Pulmonary disease pattern  Moderate voltage criteria for LVH, may be normal variant  Abnormal ECG  When compared with ECG of 03-APR-2017 15:40,  Significant changes have occurred     URINALYSIS W/ REFLEX CULTURE    Collection Time: 06/10/18 12:21 PM   Result Value Ref Range    Color YELLOW/STRAW      Appearance CLEAR CLEAR      Specific gravity 1.009 1.003 - 1.030      pH (UA) 6.5 5.0 - 8.0      Protein 30 (A) NEG mg/dL    Glucose NEGATIVE  NEG mg/dL    Ketone NEGATIVE  NEG mg/dL    Bilirubin NEGATIVE  NEG      Blood NEGATIVE  NEG      Urobilinogen 0.2 0.2 - 1.0 EU/dL    Nitrites NEGATIVE  NEG      Leukocyte Esterase NEGATIVE  NEG      WBC 0-4 0 - 4 /hpf    RBC 0-5 0 - 5 /hpf    Epithelial cells FEW FEW /lpf    Bacteria NEGATIVE  NEG /hpf    UA:UC IF INDICATED CULTURE NOT INDICATED BY UA RESULT CNI

## 2018-06-10 NOTE — DISCHARGE SUMMARY
Death summary    Death Diagnoses:    Severe sepsis  Viral syndrome/gastroenteritis  Chronic cholelithiasis  Chronic Constipation  Comatose POA (as addendeum on 6/19)      Pt was admitted with Nausea/Vomiting with Fever from NH. Was found to be in severe metabolic acidosis /severe sepsis in ER. Was given empiric IV ABx, IVfluids bolus + infusion , Bicarb drip. Pt had relatively negative workup including CXR, CT A/p, CT Chest, UA. Pt's daughter was offered autopsy- likely going to decline but thinking for now. Pt was DNR on chart by her own choice.

## 2018-06-10 NOTE — ED NOTES
Pt not responding to verbal or pain. Notified Dr Erin Regalado. Placed pt on NRB. Pt's O2 steadily decreasing. Per Dr Erin Regalado pt is DNR and DNI. Forms on file.

## 2018-06-10 NOTE — ED NOTES
Medicated pt per orders. Pt resting on stretcher in POC with call bell in reach. Pt remains on monitor x 3. VSS at this time.

## 2018-06-10 NOTE — PROGRESS NOTES
Called to pronounce patient. No response to noxious stimuli. No spontaneous breath sounds nor cardiac sounds. Pupils fixed and dilated. TOD: 17:07. Family notified and grieving appropriately.    D/C Summary, death certificate, and death note to be completed by Attending MD.  Adiel Musa MD

## 2018-06-10 NOTE — ED NOTES
Assumed care of pt via EMS stretcher. Pt is hard of hearing, however is A&Ox 4. Pt reports CC of N/V/D since early this morning. Pt reports she ate [employer] last night and believes she has food poisoning. Pt placed on monitor x 3. VSS No acute distress noted at this time.

## 2018-06-10 NOTE — ED NOTES
Pt maintained Sat, decreased O2 to 2. Pt resting on stretcher in POC with call bell in reach and family at bedside. Pt remains on monitor x 3.

## 2018-06-11 LAB
ATRIAL RATE: 101 BPM
CALCULATED P AXIS, ECG09: 88 DEGREES
CALCULATED R AXIS, ECG10: -62 DEGREES
CALCULATED T AXIS, ECG11: 74 DEGREES
DIAGNOSIS, 93000: NORMAL
P-R INTERVAL, ECG05: 152 MS
Q-T INTERVAL, ECG07: 382 MS
QRS DURATION, ECG06: 86 MS
QTC CALCULATION (BEZET), ECG08: 495 MS
VENTRICULAR RATE, ECG03: 101 BPM

## 2018-06-19 LAB
BACTERIA SPEC CULT: NORMAL
SERVICE CMNT-IMP: NORMAL

## 2018-06-30 LAB
Lab: NORMAL
REFERENCE LAB,REFLB: NORMAL
TEST DESCRIPTION:,ATST: NORMAL

## 2018-11-29 NOTE — PROGRESS NOTES
Problem: Mobility Impaired (Adult and Pediatric)  Goal: *Acute Goals and Plan of Care (Insert Text)  Physical Therapy Goals  Initiated 4/4/2017  1. Patient will move from supine to sit and sit to supine in bed with moderate assistance within 7 day(s). 2. Patient will transfer from bed to chair and chair to bed with maximal assistance using the least restrictive device within 7 day(s). 3. Patient will perform sit to stand with moderate assistance within 7 day(s). 4. Patient will ambulate with maximal assistance for 5 feet with the least restrictive device within 7 day(s). PHYSICAL THERAPY TREATMENT  Patient: Steve Genao (6 y.o. female)  Date: 4/5/2017  Diagnosis: Fracture of Mid-shaft Femur  Femur fracture, right (HCC)  Open fracture of left distal radius Open fracture of distal end of left radius  Procedure(s) (LRB):  FEMUR OPEN REDUCTION INTERNAL FIXATION, Synthes Nail,irrigation and [id number], closed reduction and casting left wrist; debridement and wound closure right elbow (Right) 2 Days Post-Op  Precautions: Fall, WBAT (WBAT RLE, platform for LUE)      ASSESSMENT:  Pt was received in supine and cleared by nursing to mobilize. Bed mobility has improved to min A to come to EOB. Sit<>stand was performed 4x with mod A x 2 to come to full stand. She does better with sit<>stand without AD and using HHA. Stood for 1 minutes with min A x 2. Attempted to side step again with HHA, able to barely lift her R foot but not able to lift the L. She can scoot her foot to the side slightly. She was returned to supine with mod A to help with LEs. She is able to push through LEs to help with scooting. Recommending SNF at discharge.    Progression toward goals:  [ ]    Improving appropriately and progressing toward goals  [X]    Improving slowly and progressing toward goals  [ ]    Not making progress toward goals and plan of care will be adjusted       PLAN:  Patient continues to benefit from skilled intervention to address the above impairments. Continue treatment per established plan of care. Discharge Recommendations:  Tye Manzo  Further Equipment Recommendations for Discharge:  TBD       SUBJECTIVE:   Patient stated this was better than yesterday .       OBJECTIVE DATA SUMMARY:   Critical Behavior:  Neurologic State: Alert, Appropriate for age  Orientation Level: Appropriate for age, Oriented X4  Cognition: Appropriate decision making     Functional Mobility Training:  Bed Mobility:     Supine to Sit: Minimum assistance;Assist x2              Transfers:  Sit to Stand: Moderate assistance;Maximum assistance; Additional time;Assist x2 (does much better with HHA)  Stand to Sit: Moderate assistance;Assist x2                             Balance:  Sitting: Intact  Standing: Impaired  Standing - Static: Fair;Constant support  Standing - Dynamic : Poor  Ambulation/Gait Training:   continue to attempt side steps, but she is unable to shift weight to the R and lift the LLE                    Pain:  Pain Scale 1: Numeric (0 - 10)  Pain Intensity 1: 2  Pain Location 1: Hand;Mediastinum  Pain Orientation 1: Left;Right  Pain Description 1: Aching  Pain Intervention(s) 1: Elevation  Activity Tolerance:   WFL  Please refer to the flowsheet for vital signs taken during this treatment.   After treatment:   [ ]    Patient left in no apparent distress sitting up in chair  [X]    Patient left in no apparent distress in bed  [X]    Call bell left within reach  [X]    Nursing notified  [ ]    Caregiver present  [ ]    Bed alarm activated      COMMUNICATION/COLLABORATION:   The patients plan of care was discussed with: Registered Nurse     Irene Alonzo PT, DPT   Time Calculation: 30 mins Oriented - self; Oriented - place; Oriented - time

## (undated) DEVICE — SUTURE VCRL SZ 0 L36IN ABSRB VLT L36MM CT-1 1/2 CIR J346H

## (undated) DEVICE — STERILE POLYISOPRENE POWDER-FREE SURGICAL GLOVES: Brand: PROTEXIS

## (undated) DEVICE — 3M™ COBAN™ NL STERILE NON-LATEX SELF-ADHERENT WRAP, 2086S, 6 IN X 5 YD (15 CM X 4,5 M), 12 ROLLS/CASE: Brand: 3M™ COBAN™

## (undated) DEVICE — GAUZE SPONGES,12 PLY: Brand: CURITY

## (undated) DEVICE — STERILE LATEX POWDER-FREE SURGICAL GLOVESWITH NITRILE COATING: Brand: PROTEXIS

## (undated) DEVICE — SUTURE VCRL SZ 2-0 L36IN ABSRB VLT L36MM CT-1 1/2 CIR J345H

## (undated) DEVICE — INFECTION CONTROL KIT SYS

## (undated) DEVICE — GUIDEWIRE ORTH L290MM DIA3.2MM FOR RG AG EXPERT FEM NAILING

## (undated) DEVICE — BIT DRL L145MM DIA4.2MM NONSTERILE 3 FLUT NDL PNT QUIK CPL

## (undated) DEVICE — CUSTOM CAST PD STR

## (undated) DEVICE — SUTURE MCRYL SZ 4-0 L27IN ABSRB UD L19MM PS-2 1/2 CIR PRIM Y426H

## (undated) DEVICE — HOOK LOCK LATEX FREE ELASTIC BANDAGE D/L 6INX10YD

## (undated) DEVICE — (D)PREP SKN CHLRAPRP APPL 26ML -- CONVERT TO ITEM 371833

## (undated) DEVICE — DEVON™ KNEE AND BODY STRAP 60" X 3" (1.5 M X 7.6 CM): Brand: DEVON

## (undated) DEVICE — BIT DRL L330MM DIA4.2MM CALIB 100MM 3 FLUT QUIK CPL

## (undated) DEVICE — OCCLUSIVE GAUZE STRIP,3% BISMUTH TRIBROMOPHENATE IN PETROLATUM BLEND: Brand: XEROFORM

## (undated) DEVICE — DRAPE,EXTREMITY,89X128,STERILE: Brand: MEDLINE

## (undated) DEVICE — REM POLYHESIVE ADULT PATIENT RETURN ELECTRODE: Brand: VALLEYLAB

## (undated) DEVICE — SYRINGE MED 20ML STD CLR PLAS LUERLOCK TIP N CTRL DISP

## (undated) DEVICE — SUTURE VCRL SZ 1 L27IN ABSRB UD CT-1 L36MM 1/2 CIR J261H

## (undated) DEVICE — SOLUTION IRRIG 1000ML H2O STRL BLT

## (undated) DEVICE — SHEET, DRAPE, SPLIT, STERILE: Brand: MEDLINE

## (undated) DEVICE — INTENDED FOR TISSUE SEPARATION, AND OTHER PROCEDURES THAT REQUIRE A SHARP SURGICAL BLADE TO PUNCTURE OR CUT.: Brand: BARD-PARKER ® CARBON RIB-BACK BLADES

## (undated) DEVICE — 3M™ TEGADERM™ TRANSPARENT FILM DRESSING FRAME STYLE, 1626W, 4 IN X 4-3/4 IN (10 CM X 12 CM), 50/CT 4CT/CASE: Brand: 3M™ TEGADERM™

## (undated) DEVICE — DRAPE XR C ARM 41X74IN LF --

## (undated) DEVICE — ROCKER SWITCH PENCIL BLADE ELECTRODE, HOLSTER: Brand: EDGE

## (undated) DEVICE — SURGICAL PROCEDURE PACK BASIN MAJ SET CUST NO CAUT

## (undated) DEVICE — BASIC PACK: Brand: CONVERTORS

## (undated) DEVICE — SLIM BODY SKIN STAPLER: Brand: APPOSE ULC

## (undated) DEVICE — ADAPTER CIRC OD22XID15MM FOR MON VENT PARAMETER

## (undated) DEVICE — SOLUTION IV 1000ML 0.9% SOD CHL

## (undated) DEVICE — HANDLE LT SNAP ON ULT DURABLE LENS FOR TRUMPF ALC DISPOSABLE

## (undated) DEVICE — SUTURE VCRL SZ 3-0 L27IN ABSRB UD L26MM SH 1/2 CIR J416H

## (undated) DEVICE — ASTOUND STANDARD SURGICAL GOWN, XL: Brand: CONVERTORS

## (undated) DEVICE — ROD RMR L950MM DIA2.5MM W/ EXTN BALL TIP

## (undated) DEVICE — 3000CC GUARDIAN II: Brand: GUARDIAN

## (undated) DEVICE — DRAPE,U/ SHT,SPLIT,PLAS,STERIL: Brand: MEDLINE

## (undated) DEVICE — TOWEL SURG W17XL27IN STD BLU COT NONFENESTRATED PREWASHED